# Patient Record
Sex: FEMALE | Race: WHITE | NOT HISPANIC OR LATINO | Employment: FULL TIME | URBAN - METROPOLITAN AREA
[De-identification: names, ages, dates, MRNs, and addresses within clinical notes are randomized per-mention and may not be internally consistent; named-entity substitution may affect disease eponyms.]

---

## 2017-10-19 ENCOUNTER — OFFICE VISIT - RIVER FALLS (OUTPATIENT)
Dept: FAMILY MEDICINE | Facility: CLINIC | Age: 42
End: 2017-10-19

## 2017-10-19 ASSESSMENT — MIFFLIN-ST. JEOR: SCORE: 1607.02

## 2017-10-21 LAB — HBA1C MFR BLD: 5.3 %

## 2017-12-06 ENCOUNTER — OFFICE VISIT - RIVER FALLS (OUTPATIENT)
Dept: FAMILY MEDICINE | Facility: CLINIC | Age: 42
End: 2017-12-06

## 2017-12-06 ASSESSMENT — MIFFLIN-ST. JEOR: SCORE: 1611.56

## 2018-02-22 ENCOUNTER — OFFICE VISIT - RIVER FALLS (OUTPATIENT)
Dept: FAMILY MEDICINE | Facility: CLINIC | Age: 43
End: 2018-02-22

## 2018-02-22 ASSESSMENT — MIFFLIN-ST. JEOR: SCORE: 1519.93

## 2018-02-23 LAB
CREAT SERPL-MCNC: 0.89 MG/DL (ref 0.5–1.1)
GLUCOSE BLD-MCNC: 92 MG/DL (ref 65–99)

## 2018-05-03 ENCOUNTER — OFFICE VISIT - RIVER FALLS (OUTPATIENT)
Dept: FAMILY MEDICINE | Facility: CLINIC | Age: 43
End: 2018-05-03

## 2018-05-03 ASSESSMENT — MIFFLIN-ST. JEOR: SCORE: 1515.39

## 2018-06-28 ENCOUNTER — OFFICE VISIT - RIVER FALLS (OUTPATIENT)
Dept: FAMILY MEDICINE | Facility: CLINIC | Age: 43
End: 2018-06-28

## 2018-10-10 ENCOUNTER — OFFICE VISIT - RIVER FALLS (OUTPATIENT)
Dept: FAMILY MEDICINE | Facility: CLINIC | Age: 43
End: 2018-10-10

## 2018-10-10 ASSESSMENT — MIFFLIN-ST. JEOR: SCORE: 1511.77

## 2019-02-06 ENCOUNTER — OFFICE VISIT - RIVER FALLS (OUTPATIENT)
Dept: FAMILY MEDICINE | Facility: CLINIC | Age: 44
End: 2019-02-06

## 2019-02-06 ASSESSMENT — MIFFLIN-ST. JEOR: SCORE: 1560.75

## 2019-11-26 ENCOUNTER — OFFICE VISIT - RIVER FALLS (OUTPATIENT)
Dept: FAMILY MEDICINE | Facility: CLINIC | Age: 44
End: 2019-11-26

## 2020-02-06 ENCOUNTER — OFFICE VISIT - RIVER FALLS (OUTPATIENT)
Dept: FAMILY MEDICINE | Facility: CLINIC | Age: 45
End: 2020-02-06

## 2020-02-06 ASSESSMENT — MIFFLIN-ST. JEOR: SCORE: 1599.76

## 2020-02-07 LAB
BUN SERPL-MCNC: 16 MG/DL (ref 7–25)
BUN/CREAT RATIO - HISTORICAL: NORMAL (ref 6–22)
CALCIUM SERPL-MCNC: 9.5 MG/DL (ref 8.6–10.2)
CHLORIDE BLD-SCNC: 102 MMOL/L (ref 98–110)
CO2 SERPL-SCNC: 27 MMOL/L (ref 20–32)
CREAT SERPL-MCNC: 0.87 MG/DL (ref 0.5–1.1)
EGFRCR SERPLBLD CKD-EPI 2021: 80 ML/MIN/1.73M2
GLUCOSE BLD-MCNC: 97 MG/DL (ref 65–99)
POTASSIUM BLD-SCNC: 4.5 MMOL/L (ref 3.5–5.3)
SODIUM SERPL-SCNC: 138 MMOL/L (ref 135–146)
TSH SERPL DL<=0.005 MIU/L-ACNC: 4.67 MIU/L

## 2020-02-11 ENCOUNTER — COMMUNICATION - RIVER FALLS (OUTPATIENT)
Dept: FAMILY MEDICINE | Facility: CLINIC | Age: 45
End: 2020-02-11

## 2020-05-11 ENCOUNTER — AMBULATORY - RIVER FALLS (OUTPATIENT)
Dept: FAMILY MEDICINE | Facility: CLINIC | Age: 45
End: 2020-05-11

## 2020-05-12 LAB — TSH SERPL DL<=0.005 MIU/L-ACNC: 4.05 MIU/L

## 2020-05-13 ENCOUNTER — COMMUNICATION - RIVER FALLS (OUTPATIENT)
Dept: FAMILY MEDICINE | Facility: CLINIC | Age: 45
End: 2020-05-13

## 2020-08-18 ENCOUNTER — COMMUNICATION - RIVER FALLS (OUTPATIENT)
Dept: FAMILY MEDICINE | Facility: CLINIC | Age: 45
End: 2020-08-18

## 2020-12-02 ENCOUNTER — OFFICE VISIT - RIVER FALLS (OUTPATIENT)
Dept: FAMILY MEDICINE | Facility: CLINIC | Age: 45
End: 2020-12-02

## 2021-01-07 ENCOUNTER — COMMUNICATION - RIVER FALLS (OUTPATIENT)
Dept: FAMILY MEDICINE | Facility: CLINIC | Age: 46
End: 2021-01-07

## 2022-02-12 VITALS
DIASTOLIC BLOOD PRESSURE: 64 MMHG | HEIGHT: 65 IN | TEMPERATURE: 98.1 F | SYSTOLIC BLOOD PRESSURE: 116 MMHG | BODY MASS INDEX: 32.49 KG/M2 | HEART RATE: 100 BPM | WEIGHT: 195 LBS

## 2022-02-12 VITALS
DIASTOLIC BLOOD PRESSURE: 80 MMHG | WEIGHT: 216.2 LBS | DIASTOLIC BLOOD PRESSURE: 76 MMHG | BODY MASS INDEX: 32.65 KG/M2 | HEART RATE: 76 BPM | HEART RATE: 72 BPM | HEIGHT: 65 IN | SYSTOLIC BLOOD PRESSURE: 120 MMHG | TEMPERATURE: 97.8 F | SYSTOLIC BLOOD PRESSURE: 120 MMHG | TEMPERATURE: 98.1 F | WEIGHT: 196 LBS | HEIGHT: 65 IN | BODY MASS INDEX: 36.02 KG/M2

## 2022-02-12 VITALS
DIASTOLIC BLOOD PRESSURE: 70 MMHG | SYSTOLIC BLOOD PRESSURE: 120 MMHG | TEMPERATURE: 99 F | HEART RATE: 93 BPM | OXYGEN SATURATION: 97 % | BODY MASS INDEX: 31.85 KG/M2 | WEIGHT: 191.4 LBS

## 2022-02-12 VITALS
HEIGHT: 65 IN | SYSTOLIC BLOOD PRESSURE: 118 MMHG | DIASTOLIC BLOOD PRESSURE: 76 MMHG | TEMPERATURE: 97.4 F | BODY MASS INDEX: 34.16 KG/M2 | WEIGHT: 205 LBS | HEART RATE: 80 BPM

## 2022-02-12 VITALS
TEMPERATURE: 98 F | HEIGHT: 65 IN | SYSTOLIC BLOOD PRESSURE: 118 MMHG | BODY MASS INDEX: 32.36 KG/M2 | HEART RATE: 72 BPM | WEIGHT: 194.2 LBS | DIASTOLIC BLOOD PRESSURE: 80 MMHG

## 2022-02-12 VITALS
SYSTOLIC BLOOD PRESSURE: 114 MMHG | BODY MASS INDEX: 35.59 KG/M2 | HEIGHT: 65 IN | HEART RATE: 72 BPM | TEMPERATURE: 99.6 F | WEIGHT: 213.6 LBS | DIASTOLIC BLOOD PRESSURE: 68 MMHG

## 2022-02-12 VITALS
OXYGEN SATURATION: 97 % | BODY MASS INDEX: 37.21 KG/M2 | DIASTOLIC BLOOD PRESSURE: 80 MMHG | WEIGHT: 223.6 LBS | SYSTOLIC BLOOD PRESSURE: 118 MMHG | TEMPERATURE: 98 F | HEART RATE: 81 BPM

## 2022-02-12 VITALS
DIASTOLIC BLOOD PRESSURE: 84 MMHG | BODY MASS INDEX: 35.85 KG/M2 | HEIGHT: 65 IN | TEMPERATURE: 98.8 F | HEART RATE: 92 BPM | WEIGHT: 215.2 LBS | SYSTOLIC BLOOD PRESSURE: 134 MMHG

## 2022-02-15 NOTE — NURSING NOTE
Depression Screening Entered On:  12/4/2020 10:01 AM CST    Performed On:  12/2/2020 10:00 AM CST by Tova Chen               Depression Screening   Little Interest - Pleasure in Activities :   More than half the days   Feeling Down, Depressed, Hopeless :   More than half the days   Initial Depression Screen Score :   4 Score   Poor Appetite or Overeating :   More than half the days   Trouble Falling or Staying Asleep :   Nearly every day   Feeling Tired or Little Energy :   Nearly every day   Feeling Bad About Yourself :   Several days   Trouble Concentrating :   Nearly every day   Moving or Speaking Slowly :   Several days   Thoughts Better Off Dead or Hurting Self :   Not at all   MICHAEL Difficulty with Work, Home, Others :   Somewhat difficult   Detailed Depression Screen Score :   13    Total Depression Screen Score :   17    Tova Chen - 12/4/2020 10:00 AM CST

## 2022-02-15 NOTE — NURSING NOTE
Comprehensive Intake Entered On:  2/6/2019 4:02 PM CST    Performed On:  2/6/2019 3:57 PM CST by Jenny Richards MA               Summary   Chief Complaint :   skin tag removal from neck.  also discuss alternative to Lorcaserin.   Weight Measured :   205 lb(Converted to: 205 lb 0 oz, 92.99 kg)    Height Measured :   65 in(Converted to: 5 ft 5 in, 165.10 cm)    Body Mass Index :   34.11 kg/m2 (HI)    Body Surface Area :   2.06 m2   Systolic Blood Pressure :   118 mmHg   Diastolic Blood Pressure :   76 mmHg   Mean Arterial Pressure :   90 mmHg   Peripheral Pulse Rate :   80 bpm   BP Site :   Right arm   Pulse Site :   Radial artery   BP Method :   Manual   HR Method :   Manual   Temperature Tympanic :   97.4 DegF(Converted to: 36.3 DegC)  (LOW)    Jenny Richards MA - 2/6/2019 3:57 PM CST   Health Status   Allergies Verified? :   Yes   Medication History Verified? :   Yes   Medical History Verified? :   Yes   Pre-Visit Planning Status :   Completed   Tobacco Use? :   Never smoker   Jenny Richards MA - 2/6/2019 3:57 PM CST   Consents   Consent for Immunization Exchange :   Consent Granted   Consent for Immunizations to Providers :   Consent Granted   Jenny Richards MA - 2/6/2019 3:57 PM CST   Meds / Allergies   (As Of: 2/6/2019 4:02:26 PM CST)   Allergies (Active)   Zithromax  Estimated Onset Date:   Unspecified ; Reactions:   vomiting ; Created By:   Jenny Richards; Reaction Status:   Active ; Category:   Drug ; Substance:   Zithromax ; Type:   Allergy ; Updated By:   Jenny Richards; Source:   Paper Chart ; Reviewed Date:   2/22/2018 4:18 PM CST        Medication List   (As Of: 2/6/2019 4:02:26 PM CST)   Prescription/Discharge Order    albuterol  :   albuterol ; Status:   Prescribed ; Ordered As Mnemonic:   Ventolin HFA 90 mcg/inh inhalation aerosol ; Simple Display Line:   2 puff(s), inh, qid, PRN: as needed for wheezing, 3 EA, 2 Refill(s) ; Ordering Provider:   Vlad Farley MD; Catalog Code:   albuterol  ; Order Dt/Tm:   3/31/2016 2:58:59 PM          fluticasone-salmeterol  :   fluticasone-salmeterol ; Status:   Prescribed ; Ordered As Mnemonic:   Advair Diskus 500 mcg-50 mcg inhalation powder ; Simple Display Line:   1 puff(s), inh, bid, 1 EA, 5 Refill(s) ; Ordering Provider:   Vlad Farley MD; Catalog Code:   fluticasone-salmeterol ; Order Dt/Tm:   3/31/2016 2:58:49 PM          hydroCHLOROthiazide  :   hydroCHLOROthiazide ; Status:   Prescribed ; Ordered As Mnemonic:   hydroCHLOROthiazide 12.5 mg oral tablet ; Simple Display Line:   12.5 mg, 1 tab(s), po, daily, 30 tab(s), 2 Refill(s) ; Ordering Provider:   Vlad Farley MD; Catalog Code:   hydroCHLOROthiazide ; Order Dt/Tm:   2/22/2018 4:45:41 PM          lorcaserin  :   lorcaserin ; Status:   Prescribed ; Ordered As Mnemonic:   lorcaserin 20 mg oral tablet, extended release ; Simple Display Line:   See Instructions, Take one by mouth daily, 30 tab(s), 2 Refill(s) ; Ordering Provider:   Vlad Farley MD; Catalog Code:   lorcaserin ; Order Dt/Tm:   10/10/2018 12:04:59 PM            Social History   Social History   (As Of: 2/6/2019 4:02:26 PM CST)   Alcohol:        Never   (Last Updated: 5/17/2012 1:02:27 PM CDT by Gia Carrillo)          Tobacco:        Never   (Last Updated: 3/30/2011 3:42:14 PM CDT by Jenny Richards MA)          Substance Abuse:        Never   (Last Updated: 3/30/2011 3:42:23 PM CDT by Jenny Richards MA)          Employment and Education:        [employer]   (Last Updated: 5/17/2012 1:02:06 PM CDT by Gia Carrillo)          Home and Environment:        Marital status: .  Spouse/Partner name: Jeovanny.  Lives with Children, Spouse.   (Last Updated: 6/12/2013 9:31:49 AM CDT by Jenny Richards MA)          Nutrition and Health:        Type of diet: Regular.   (Last Updated: 5/17/2012 1:05:11 PM CDT by Gia Carrillo)          Sexual:         Comments:  3/30/2011 3:42 PM - Susie DASH, Jenny: --Jeovanny.    (Last Updated: 3/30/2011 3:42:43 PM CDT by Jenny Richards MA)          Other:        First menses age 12.  Menstrual duration 3 days.  Cycle interval 30 days.  No abnormal pap smear.   (Last Updated: 5/17/2012 1:01:42 PM CDT by Gia Carrillo)

## 2022-02-15 NOTE — NURSING NOTE
Comprehensive Intake Entered On:  12/2/2020 9:01 AM CST    Performed On:  12/2/2020 8:56 AM CST by Lacie Vidal CMA               Summary   Chief Complaint :   Med check.    Weight Measured :   223.6 lb(Converted to: 223 lb 10 oz, 101.423 kg)    Height/Length Estimated :   65 in(Converted to: 5 ft 5 in, 165.10 cm)    Systolic Blood Pressure :   118 mmHg   Diastolic Blood Pressure :   80 mmHg   Mean Arterial Pressure :   93 mmHg   Peripheral Pulse Rate :   81 bpm   BP Site :   Right arm   BP Method :   Manual   HR Method :   Electronic   Temperature Tympanic :   98.0 DegF(Converted to: 36.7 DegC)    Oxygen Saturation :   97 %   Lacie Vidal CMA - 12/2/2020 8:56 AM CST   Health Status   Allergies Verified? :   Yes   Medication History Verified? :   Yes   Pre-Visit Planning Status :   N/A   Tobacco Use? :   Never smoker   Lacie Vidal CMA - 12/2/2020 8:56 AM CST   Consents   Consent for Immunization Exchange :   Consent Granted   Consent for Immunizations to Providers :   Consent Granted   Lacie Vidal CMA - 12/2/2020 8:56 AM CST   Meds / Allergies   (As Of: 12/2/2020 9:01:01 AM CST)   Allergies (Active)   Zithromax  Estimated Onset Date:   Unspecified ; Reactions:   vomiting ; Created By:   Jenny Richards; Reaction Status:   Active ; Category:   Drug ; Substance:   Zithromax ; Type:   Allergy ; Updated By:   Jenny Richards; Source:   Paper Chart ; Reviewed Date:   2/22/2018 4:18 PM CST        Medication List   (As Of: 12/2/2020 9:01:01 AM CST)   Prescription/Discharge Order    albuterol  :   albuterol ; Status:   Prescribed ; Ordered As Mnemonic:   Ventolin HFA 90 mcg/inh inhalation aerosol ; Simple Display Line:   2 puff(s), inh, qid, PRN: as needed for wheezing, 3 EA, 2 Refill(s) ; Ordering Provider:   Vlad Farley MD; Catalog Code:   albuterol ; Order Dt/Tm:   3/31/2016 2:58:59 PM CDT            ID Risk Screen   Recent Travel History :   No recent travel   Family Member Travel History :   No  recent travel   Other Exposure to Infectious Disease :   Unknown   Lacie Vidal CMA - 12/2/2020 8:56 AM CST   Social History   Social History   (As Of: 12/2/2020 9:01:01 AM CST)   Alcohol:        Never   (Last Updated: 5/17/2012 1:02:27 PM CDT by Gia Carrillo)          Tobacco:        Never   (Last Updated: 3/30/2011 3:42:14 PM CDT by Jenny Richards MA)   Never (less than 100 in lifetime)   (Last Updated: 12/2/2020 8:59:12 AM CST by Lacie Vidal CMA)          Electronic Cigarette/Vaping:        Electronic Cigarette Use: Never.   (Last Updated: 12/2/2020 8:59:16 AM CST by Lacie Vidal CMA)          Substance Abuse:        Never   (Last Updated: 3/30/2011 3:42:23 PM CDT by Jenny Richards MA)          Employment/School:        [employer]   (Last Updated: 5/17/2012 1:02:06 PM CDT by Gia Carrillo)          Home/Environment:        Marital status: .  Spouse/Partner name: Jeovanny.  Lives with Children, Spouse.   (Last Updated: 6/12/2013 9:31:49 AM CDT by Jenny Richards MA)          Nutrition/Health:        Type of diet: Regular.   (Last Updated: 5/17/2012 1:05:11 PM CDT by Gia Carrillo)          Sexual:         Comments:  3/30/2011 3:42 PM - Jenny Richards MA: --Jeovanny.   (Last Updated: 3/30/2011 3:42:43 PM CDT by Jenny Richards MA)          Other:        First menses age 12.  Menstrual duration 3 days.  Cycle interval 30 days.  No abnormal pap smear.   (Last Updated: 5/17/2012 1:01:42 PM CDT by Gia Carrillo)

## 2022-02-15 NOTE — PROGRESS NOTES
Chief Complaint    Med check.  History of Present Illness       Patient is here today with complaints of increasing problems with depression and anxiety.  She reports feeling like she is on a roller coaster.  She has had trouble with depression in the past.  Symptoms have been present over the last several months.  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  She denies suicidal ideation.  She endorses fatigue and decreased energy.  She also has been quite irritable and has difficulty with worry.  Her youngest daughter has no secondary at [employer].  Her son enlisted in the [employer] and is being [facility] [employer].  This of course is on her as well.  PHQ-9 is 17 and MICHAEL is 19  Review of Systems       See HPI.  All other review of systems negative.  Physical Exam   Vitals & Measurements    T: 98.0  F (Tympanic)  HR: 81 (Peripheral)  BP: 118/80  SpO2: 97%     HT: 65 in  WT: 223.6 lb        Alert, oriented, no acute distress       Normal heart rate       Lungs are clear       Cooperative, depressed affect, tearful  Assessment/Plan       1. Major Depressive Disorder, Single Episode, Unspecified Degree (F32.1)          We reviewed the options for treatment.  I have encouraged counseling.  Bupropion  mg daily has been prescribed.  She will start at 150 mg daily for the first 4 days and then increase to 300 mg daily.  Follow-up visit recommended and 1 month.  Side effects of medication discussed.         30 minutes spent with patient the majority in counseling and discussion of care coordination.       Orders:         albuterol, 2 puff(s), inh, qid, PRN: as needed for wheezing, # 1 EA, 4 Refill(s), Pharmacy: [facility], 2 puff(s) Inhale qid,PRN:as needed for wheezing, 65, in, 02/06/20 17:02:00 CST, Height Measured, 223.6, lb, 12/02/20 8:56:00 CST, Weight Measured, (Ordered)         albuterol, = 2 puff(s), inh, qid, PRN: as needed for  wheezing, # 3 EA, 2 Refill(s), Type: Hard Stop, Pharmacy: [facility], (Completed)         buPROPion, 2 tab(s) ( 300 mg ), Oral, q 24 hrs, # 60 tab(s), 2 Refill(s), Type: Maintenance, Pharmacy: [facility], 2 tab(s) Oral q 24 hrs, 65, in, 02/06/20 17:02:00 CST, Height Measured, 223.6, lb, 12/02/20 8:56:00 CST, Weight Measured, (Ordered)         bupropion-naltrexone, 2 tab(s), Oral, bid, Instructions: take once a day for one week increasing by one tab a week until taking 2 tabs bid, # 120 tab(s), 0 Refill(s), Type: Maintenance, Pharmacy: [facility], 2 tab(s) Oral bid,Instr:take once a day for one week increa..., (Completed)         hydroCHLOROthiazide, = 1 tab(s) ( 12.5 mg ), Oral, daily, # 30 tab(s), 0 Refill(s), Type: Maintenance, Pharmacy: [facility] [place], 1 tab(s) Oral daily, (Completed)         Return to Clinic (Request), RFV: TSH per GTG. Dx: abnormal TSH from 2/6/2020, Return in 6 weeks  Patient Information     Name:MARII LEW ALEJO      Address:      [address] [place] [id number]     Sex:Female     YOB: 1975     [phone]     Emergency Contact:VARSHA LEW     MRN:[id number]     FIN:[id number]     Location:[facility]     Date of Service:12/02/2020      Primary Care Physician:       Vlad Farley MD, [phone]      Attending Physician:       Vlad Farley MD, [phone]  Problem List/Past Medical History    Ongoing     Anxiety     Attention Deficit Disorder of Childhood without Mention of Hyperactivity     Family history of colon cancer     Female stress incontinence     Intermittent asthma     Major Depressive Disorder, Single Episode, Unspecified Degree     Mild recurrent major depression     Obesity     CELINA on CPAP    Historical     Pregnancy     Pregnancy     Pregnancy     Pregnancy     Pregnancy  Procedure/Surgical History     Colonoscopy (04/22/2011)      Comments: Repeat in 5 years due to family hx.  Sedation:   MAC.     THAB x2  Medications    buPROPion 150 mg/24 hours (XL) oral tablet, extended release, 300 mg= 2 tab(s), Oral, q 24 hrs, 2 refills    Ventolin HFA 90 mcg/inh inhalation aerosol, 2 puff(s), Inhale, qid, PRN, 4 refills  Allergies    Zithromax (vomiting)  Social History    Smoking Status     Never smoker     Alcohol      Never     Electronic Cigarette/Vaping      Electronic Cigarette Use: Never.     Employment/School      [employer]     Home/Environment      Marital status: . Spouse/Partner name: Jeovanny. Lives with Children, Spouse.     Nutrition/Health      Type of diet: Regular.     Other      First menses age 12. Menstrual duration 3 days. Cycle interval 30 days. No abnormal pap smear.     Sexual     Substance Abuse      Never     Tobacco      Never (less than 100 in lifetime)  Family History    Cancer: Mother.    Heart disease: Father.    Brother: History is negative    Daughter: History is unknown    Son: History is unknown    Daughter: History is unknown  Immunizations      Vaccine Date Status          Td 04/12/2004 Recorded

## 2022-02-15 NOTE — TELEPHONE ENCOUNTER
---------------------  From: Latrice Roth   To: Miguelito WALLACE, Vlad;     Sent: 11/26/2019 7:47:09 AM CST  Subject: Scheduling Management     PT CANCELED APPT TODAY FOR MED CHECK, DID NOT WANT TO RESCHEDULE.

## 2022-02-15 NOTE — TELEPHONE ENCOUNTER
---------------------  From: Vlad Farley MD   To: MARII LEW    Sent: 5/13/2020 8:40:00 AM CDT  Subject: Patient Message - Results Notification        Your thyroid test is in the normal range.    Results:  Date Result Name Value   5/11/2020 1:06 PM TSH 4.05 mIU/L

## 2022-02-15 NOTE — PROGRESS NOTES
Chief Complaint    skin tag removal from neck.  also discuss alternative to Lorcaserin.  History of Present Illness      Patient is here with several concerns.  She has been taking lorcaserin over the last month without any success in weight loss.  She admits to emotional eating.  She has not tried to count calories in the past.  Her exercise program is minimal.  She has been on phentermine, topiramate/phentermine and lorcaserin previously for her medically supervised weight loss.       She has an irritated skin tag in the left neck.  It gets caught in jewelry.  She has a painful left ring finger.  She reports catching in the morning.  It is sometimes hard to extend the finger.  She denies any injury.  Review of Systems      See HPI.  All other review of systems negative.  Physical Exam   Vitals & Measurements    T: 97.4   F (Tympanic)  HR: 80(Peripheral)  BP: 118/76     HT: 65 in  WT: 205 lb  BMI: 34.11       Alert, oriented, no acute distress       Normal heart rate        Lungs are clear        1 mm and 2 mm slightly irritated skin tags left neck        Tenderness in the palmar aspect of the left hand along the fourth metacarpal and MCP joint.  No catching appreciated.        Cooperative, appropriate affect  Assessment/Plan       Obesity (E66.8)         Encouraged a 1602 to 1800-calorie diet and increase activity.  Trial of Contrave.       Skin tag (L91.8)         2 skin tags easily removed with a pickups and sharp scissors       Trigger finger, left (M65.342)        Observe for now.  If he becomes more symptomatic consider injection or referral to hand surgery        Orders:         bupropion-naltrexone, 2 tab(s), Oral, bid, Instructions: take once a day for one week increasing by one tab a week until taking 2 tabs bid, # 120 tab(s), 0 Refill(s), Type: Maintenance, Pharmacy: [facility], 2 tab(s) Oral bid,Instr:take once a day for one week increa..., (Ordered)  Patient Information     Name:VIPIN  MARII DHILLON      Address:      [address] [place] [id number]     Sex:Female     YOB: 1975     [phone]     Emergency Contact:VARSHA LEW     MRN:[id number]     FIN:[id number]     Location:[facility]     Date of Service:02/06/2019      Primary Care Physician:       Vlad Farley MD, [phone]      Attending Physician:       Vlad Farley MD, [phone]  Problem List/Past Medical History    Ongoing     Anxiety     Attention Deficit Disorder of Childhood without Mention of Hyperactivity     Family history of colon cancer     Female stress incontinence     Intermittent asthma     Major Depressive Disorder, Single Episode, Unspecified Degree     Mild recurrent major depression     Obesity     CELINA on CPAP    Historical     Pregnancy     Pregnancy     Pregnancy     Pregnancy     Pregnancy  Procedure/Surgical History     Colonoscopy (04/22/2011)      Comments: Repeat in 5 years due to family hx. Sedation:   MAC.     THAB x2  Medications        Advair Diskus 500 mcg-50 mcg inhalation powder: 1 puff(s), inh, bid, 1 EA, 5 Refill(s).        Ventolin HFA 90 mcg/inh inhalation aerosol: 2 puff(s), inh, qid, PRN: as needed for wheezing, 3 EA, 2 Refill(s).        hydroCHLOROthiazide 12.5 mg oral tablet: 12.5 mg, 1 tab(s), po, daily, 30 tab(s), 2 Refill(s).        Contrave 8 mg-90 mg oral tablet, extended release: 2 tab(s), Oral, bid, take once a day for one week increasing by one tab a week until taking 2 tabs bid, 120 tab(s), 0 Refill(s).         Allergies    Zithromax (vomiting)  Social History    Smoking Status - 02/06/2019     Never smoker     Alcohol      Never, 05/17/2012     Employment and Education      [employer] School [employer], 05/17/2012     Home and Environment      Marital status: . Spouse/Partner name: Varsha. Lives with Children, Spouse., 06/12/2013     Nutrition and Health      Type of diet: Regular., 05/17/2012     Other      First  menses age 12. Menstrual duration 3 days. Cycle interval 30 days. No abnormal pap smear., 05/17/2012     Sexual     Substance Abuse      Never, 03/30/2011     Tobacco      Never, 03/30/2011  Family History    Cancer: Mother.    Heart disease: Father.    Brother: History is negative    Daughter: History is unknown    Son: History is unknown    Daughter: History is unknown  Immunizations      Vaccine Date Status      Td 04/12/2004 Recorded

## 2022-02-15 NOTE — PROGRESS NOTES
Chief Complaint    med check--hasn't been taking meds for awhile.  History of Present Illness      Patient is here with concerns about fatigue, weight gain, upper and lower extremity paresthesias, depression, and asthma. She has not been taking sertraline or her inhalers for the last 6 months. She has not changed her diet and has been trying to stay active. She is been gaining weight despite these activities. Her asthma has been good over last few months despite not taking her controller.  Review of Systems          Constitutional:  No fever, No chills, No sweats, No weakness, fatigue.            Eye:  No recent visual problem.            Ear/Nose/Mouth/Throat:  No decreased hearing, No nasal congestion, No sore throat.            Respiratory:  No shortness of breath, No cough.            Cardiovascular:  Negative, No chest pain, No palpitations, No peripheral edema.            Gastrointestinal:  No nausea, No vomiting, No diarrhea, No constipation, No heartburn.            Genitourinary:  No dysuria, No change in urine stream.            Hematology/Lymphatics:  No bruising tendency, No bleeding tendency.            Endocrine:  No cold intolerance, No heat intolerance.            Immunologic:  Negative.            Musculoskeletal:  No back pain, No neck pain, No joint pain, No muscle pain.            Integumentary:  No rash, No dryness, No skin lesion.            Neurologic:  Alert and oriented X4, No headache.                Psychiatric:  No anxiety, No depression  Physical Exam   Vitals & Measurements    T: 98.8(Tympanic)  HR: 92(Peripheral)  BP: 134/84     HT: 65 in  WT: 215.2 lb  BMI: 35.81           General:  Alert and oriented, No acute distress.            Eye:  Pupils are equal, round and reactive to light, Extraocular movements are intact, Normal conjunctiva.            HENT:  Normocephalic, Tympanic membranes are clear, Oral mucosa is moist, No pharyngeal erythema, No sinus tenderness.            Neck:   Supple, Non-tender, No carotid bruit, No lymphadenopathy, No thyromegaly.            Respiratory:  Lungs are clear to auscultation, Respirations are non-labored, Breath sounds are equal, No chest wall tenderness.            Cardiovascular:  Normal rate, Regular rhythm, No murmur, No gallop, Normal peripheral perfusion, edema.            Breast:  No mass, No tenderness, No discharge.            Gastrointestinal:  Soft, Non-tender, Normal bowel sounds, No organomegaly.            Genitourinary:  No costovertebral angle tenderness.            Musculoskeletal:  Normal range of motion, Normal strength, No swelling, No deformity.            Integumentary:  Warm, Dry, No rash.            Neurologic:  Alert, Oriented, Normal sensory, Normal motor function, No focal deficits, Normal deep tendon reflexes.            Psychiatric:  Cooperative, Appropriate mood & affect.    Assessment/Plan       Edema, peripheral         Ordered:                Paresthesia         Labs as below have been ordered for further evaluation of her symptoms. Consider resuming sertraline and steroid inhaler.                  Ordered:          Folate, serum* ([facility]), Specimen Type: Serum, Collection Date: 10/19/17 15:42:00 CDT          Hemoglobin A1c* ([facility]), Specimen Type: Blood, Collection Date: 10/19/17 15:42:00 CDT          Hepatic Function Panel* ([facility]), Specimen Type: Serum, Collection Date: 10/19/17 15:42:00 CDT          Protein, Total And Protein Electrophoresis* ([facility]), Specimen Type: Serum, Collection Date: 10/19/17 15:42:00 CDT          Sed rate by modified westergren* ([facility]), Specimen Type: Blood, Collection Date: 10/19/17 15:42:00 CDT          TSH* ([facility]), Specimen Type: Serum, Collection Date: 10/19/17 15:42:00 CDT          Vitamin B12 (Room)* ([facility]), Specimen Type: Serum, Collection Date: 10/19/17 15:42:00 CDT           Patient Information     Name:MARII LEW      Address:      [address] [place] [id number]      Sex:Female     YOB: 1975     [phone]     Emergency Contact:VARSHA LEW     MRN:[id number]     FIN:[id number]     Location:[facility]     Date of Service:10/19/2017      Primary Care Physician:       Vlad Farley MD, [phone]  Problem List/Past Medical History    Ongoing     Anxiety     Attention Deficit Disorder of Childhood without Mention of Hyperactivity     Family History of Colon Cancer     Female Stress Incontinence     Intermittent Asthma     Major Depressive Disorder, Single Episode, Unspecified Degree     Mild recurrent major depression     Obesity     CELINA on CPAP    Historical     Pregnancy     Pregnancy     Pregnancy     Pregnancy     Pregnancy  Procedure/Surgical History     Colonoscopy (04/22/2011)     THAB x2  Medications    Advair Diskus 500 mcg-50 mcg inhalation powder, 1 puff(s), inh, bid, 5 refills,   Not taking    sertraline 100 mg oral tablet, See Instructions, 5 refills,   Not taking    Ventolin HFA 90 mcg/inh inhalation aerosol, 2 puff(s), inh, qid, PRN, 2 refills,   Not taking  Allergies    Zithromax (vomiting)  Social History    Smoking Status - 10/19/2017     Never smoker     Alcohol - 06/12/2013      Never     Employment and Education - 06/12/2013      [employer] School [employer]     Exercise and Physical Activity - Does not exercise, 06/12/2013     Home and Environment - 06/12/2013      Marital status: . Spouse/Partner name: Varsha. Lives with Children, Spouse.     Nutrition and Health - 06/12/2013      Type of diet: Regular.     Other - 06/12/2013      First menses age 12. Menstrual duration 3 days. Cycle interval 30 days. No abnormal pap smear.     Sexual - 06/12/2013     Substance Abuse - 06/12/2013      Never     Tobacco - 06/12/2013      Never  Family History    Cancer: Mother.    Heart disease: Father.  Immunizations      Vaccine Date Status      Td 04/12/2004 Recorded  Lab Results   Results (Last 90 days)   No  results located.

## 2022-02-15 NOTE — PROGRESS NOTES
Chief Complaint    review lab results--abnormal ALT from 10/19/17  History of Present Illness      Patient is here for follow-up on her slightly abnormal liver tests.  Paresthesias have improved her last visit.  Anxiety depression stable.  She is not really interested in resuming sertraline.  She continues to be concerned about her weight.  She has not been able to lose weight despite being on a fairly good diet.  She does not exercise regularly.  She had some success with phentermine in the past for weight loss.  He feels her weight is adversely affecting her health.  Review of Systems      See HPI.  All other review of systems negative.  Physical Exam   Vitals & Measurements    T: 97.8(Tympanic)  HR: 76(Peripheral)  BP: 120/80     HT: 65 in  WT: 216.2 lb  BMI: 35.97       Alert, oriented, no acute distress      Depressed affect       Normal heart rate       Nonlabored breathing  Assessment/Plan       Anxiety         Continue to monitor this as it seems to be fairly stable.                Elevated ALT measurement         Suspect this is due to fatty liver, recheck transaminase levels         Ordered:          Hepatic Function Panel* ([facility]), Specimen Type: Serum, Collection Date: 12/06/17 15:50:00 CST                Obesity         Trial of phentermine topiramate, discussed side effects of medication, if she needs treatment for depression consider bupropion if she remains on phentermine.  She will follow-up in 1 month.  I have informed her that we will most likely need a prior authorization for the medication this may take a bit of time.                Orders:         phentermine-topiramate, 1 cap(s), po, qam, # 14 cap(s), 0 Refill(s), Type: Maintenance, Pharmacy: [facility], 1 cap(s) po qam,x14 day(s)         phentermine-topiramate, 1 cap(s), po, qam, # 30 cap(s), 0 Refill(s), Type: Maintenance, Pharmacy: [facility], 1 cap(s) po qam  Patient Information     Name:YONATAN LEWELI DHILLON      Address:       [address] [place] [address]     Sex:Female     YOB: 1975     [phone]     Emergency Contact:VARSHA LEW     MRN:[id number]     FIN:[id number]     Location:[facility]     Date of Service:12/06/2017      Primary Care Physician:       Vlad Farley MD, [phone]  Problem List/Past Medical History    Ongoing     Anxiety     Attention Deficit Disorder of Childhood without Mention of Hyperactivity     Family history of colon cancer     Female stress incontinence     Intermittent asthma     Major Depressive Disorder, Single Episode, Unspecified Degree     Mild recurrent major depression     Obesity     CELINA on CPAP    Historical     Pregnancy     Pregnancy     Pregnancy     Pregnancy     Pregnancy  Procedure/Surgical History     Colonoscopy (04/22/2011)     THAB x2  Medications    Advair Diskus 500 mcg-50 mcg inhalation powder, 1 puff(s), inh, bid, 5 refills    phentermine-topiramate 3.75 mg-23 mg oral capsule, extended release, 1 cap(s), po, qam    phentermine-topiramate 7.5 mg-46 mg oral capsule, extended release, 1 cap(s), po, qam    sertraline 100 mg oral tablet, See Instructions, 5 refills    Ventolin HFA 90 mcg/inh inhalation aerosol, 2 puff(s), inh, qid, PRN, 2 refills  Allergies    Zithromax (vomiting)  Social History    Smoking Status - 12/06/2017     Never smoker     Alcohol - 06/12/2013      Never     Employment and Education - 06/12/2013      High School Student [employer]     Home and Environment - 06/12/2013      Marital status: . Spouse/Partner name: Varsha. Lives with Children, Spouse.     Nutrition and Health - 06/12/2013      Type of diet: Regular.     Other - 06/12/2013      First menses age 12. Menstrual duration 3 days. Cycle interval 30 days. No abnormal pap smear.     Sexual - 06/12/2013     Substance Abuse - 06/12/2013      Never     Tobacco - 06/12/2013      Never  Family History    Cancer: Mother.    Heart disease:  Father.  Immunizations      Vaccine Date Status      Td 04/12/2004 Recorded  Lab Results      Results (Last 90 days)                Laboratory                     Chemistry                          General Chemistry                               A/G Ratio                                     1.8   (10/19/17 03:52 PM CDT)                                                                                                                                                2.1   (12/06/17 03:58 PM CST)                                                                                                                                          ALT/SGPT                                     H 47 unit/L (Range 6 - 29)  (10/19/17 03:52 PM CDT)                                                                                                                                                H 33 unit/L (Range 6 - 29)  (12/06/17 03:58 PM CST)                                                                                                                                          AST/SGOT                                     27 unit/L  (10/19/17 03:52 PM CDT)                                                                                                                                                20 unit/L  (12/06/17 03:58 PM CST)                                                                                                                                          Albumin Level                                     4.6 gm/dL  (10/19/17 03:52 PM CDT)                                                                                                                                                4.7 gm/dL  (10/19/17 03:52 PM CDT)                                                                                                                                                4.6 gm/dL  (12/06/17 03:58 PM CST)                                                                                                                                           Alkaline Phosphatase                                     41 unit/L  (10/19/17 03:52 PM CDT)                                                                                                                                                44 unit/L  (12/06/17 03:58 PM CST)                                                                                                                                          Bilirubin Direct                                     0.2 mg/dL  (10/19/17 03:52 PM CDT)                                                                                                                                                0.1 mg/dL  (12/06/17 03:58 PM CST)                                                                                                                                          Bilirubin Indirect                                     1.0   (10/19/17 03:52 PM CDT)                                                                                                                                                0.7   (12/06/17 03:58 PM CST)                                                                                                                                          Bilirubin Total                                     1.2 mg/dL  (10/19/17 03:52 PM CDT)                                                                                                                                                0.8 mg/dL  (12/06/17 03:58 PM CST)                                                                                                                                          Folate                                        (10/19/17 03:52 PM CDT)                                                                                                                                                12.5 ng/mL  (10/19/17 03:52 PM CDT)                                                                                                                                           Globulin                                     2.6   (10/19/17 03:52 PM CDT)                                                                                                                                                2.2   (12/06/17 03:58 PM CST)                                                                                                                                          Hgb A1c                                        (10/19/17 03:52 PM CDT)                                                                                                                                                5.3   (10/19/17 03:52 PM CDT)                                                                                                                                          Protein Total                                        (10/19/17 03:52 PM CDT)                                                                                                                                                7.2 gm/dL  (10/19/17 03:52 PM CDT)                                                                                                                                                7.2 gm/dL  (10/19/17 03:52 PM CDT)                                                                                                                                                   (12/06/17 03:58 PM CST)                                                                                                                                                6.8 gm/dL  (12/06/17 03:58 PM CST)                                                                                                                                     Special Chemistry                               Alpha 1 Globulin:      0.2 gm/dL  (10/19/17 03:52 PM CDT)                                                                                                                                           Alpha 2 Globulin:      0.6 gm/dL  (10/19/17 03:52 PM CDT)                                                                                                                                          Beta 1 Globulin:      0.4 gm/dL  (10/19/17 03:52 PM CDT)                                                                                                                                          Beta 2 Globulin:      0.3 gm/dL  (10/19/17 03:52 PM CDT)                                                                                                                                          Gamma Globulin:      0.9 gm/dL  (10/19/17 03:52 PM CDT)                                                                                                                                          Protein Electrophoresis Interp:      Protein electrophoresis pattern appears normal.   (10/19/17 03:52 PM CDT)                                                                                                                                     Thyroid Studies                               TSH                                        (10/19/17 03:52 PM CDT)                                                                                                                                                3.43 mIU/L  (10/19/17 03:52 PM CDT)                                                                                                                                     Vitamins                               Vitamin B12 Level                                        (10/19/17 03:52 PM CDT)                                                                                                                                                309 pg/mL  (10/19/17 03:52 PM CDT)                                                                                                                                 Hematology                          Other Hematology                               Sed Rate                                        (10/19/17 03:52 PM CDT)                                                                                                                                                6   (10/19/17 03:52 PM CDT)

## 2022-02-15 NOTE — NURSING NOTE
Patient called  at 1:42 because she had not received lab results. After a review of her chart it was noted the results were sent through the portal.  Patient unsure if she has access so I printed and mailed results.

## 2022-02-15 NOTE — TELEPHONE ENCOUNTER
---------------------  From: MARII LEW  To: [facility]  Sent: 01/07/2021 03:18 p.m. CST  Subject: Refill of meds  Donna Farley, I was wondering if I could get a refill on my meds or if you needed me to schedule an appointment?  I have been doing very well on the Bupropion and have definitely felt a big difference in how I feel and act.  I have lost a few lbs. but that could be due to the changes we've made at home with [employer].  I didn't realize how low I am (only a few pills left) so if you could, call it into the [facility] pharmacy or I can hopefully get in to see you early next week.  Thank you.   -Anh Lew---------------------  From: Sophia Redmond CMA ([employer] ([id number]   To: [facility] [employer] ([id number]);     Sent: 1/7/2021 3:23:32 PM CST  Subject: FW: Refill of meds---------------------  From: Natalia Michelle LPN ([employer] ([id number]))   To: Vlad Farley MD;     Sent: 1/7/2021 3:43:47 PM CST  Subject: FW: Refill of meds---------------------  From: Vlad Farley MD   To: [employer] ([id number]);     Sent: 1/7/2021 8:50:17 PM CST  Subject: RE: Refill of meds     She should have two refills left.  If not OK to refill for 2 more months.sent message to patient via portal

## 2022-02-15 NOTE — PROGRESS NOTES
Chief Complaint      f/u wt loss.      History of Present Illness      Patient is here for follow-up on weight loss.  She is currently on phentermine topiramate.  She has no side effects medication.  Her weight loss has stalled at the current dose.  She has not been quite as good with her activity nor her diet over the last several months.      Review of Systems      See HPI.  All other review of systems negative.      Physical Exam         Vitals & Measurements        T: 98.1   F (Tympanic)  HR: 100(Peripheral)  BP: 116/64         HT: 65 in  WT: 195 lb  BMI: 32.45       Alert, oriented, no acute distress      Normal heart rate      Nonlabored breathing      Cooperative, appropriate affect      Assessment/Plan      Obesity (E66.09)         Increase phentermine department to 11.2 5 69 and follow-up in 2 months.      Orders:        phentermine-topiramate, 1 cap(s), po, qam, # 30 cap(s), 1 Refill(s), Type: Maintenance, Pharmacy: [facility], 1 cap(s) po qam, (Ordered)        phentermine-topiramate, 1 cap(s), po, qam, # 14 cap(s), 0 Refill(s), Type: Maintenance, Pharmacy: [facility], 1 cap(s) po qam,x14 day(s), (Completed)        phentermine-topiramate, 1 cap(s), po, qam, # 30 cap(s), 1 Refill(s), Type: Hard Stop, Pharmacy: [facility], (Completed)      Patient Information      Name:  MARII LEW      Address:       [address] [place] [id number]      Sex: Female      YOB: 1975      Phone: [phone]      Emergency Contact: VARSHA LEW      MRN: [id number]      FIN: [id number]      Location: [facility]      Date of Service: 05/03/2018      Primary Care Physician:       Vlad Farley MD, [phone]      Attending Physician:       Vlad Farley MD, [phone]      Problem List/Past Medical History      Ongoing       Anxiety       Attention Deficit Disorder of Childhood without Mention of Hyperactivity       Family history  of colon cancer       Female stress incontinence       Intermittent asthma       Major Depressive Disorder, Single Episode, Unspecified Degree       Mild recurrent major depression       Obesity       CELINA on CPAP      Historical        Pregnancy        Pregnancy        Pregnancy        Pregnancy        Pregnancy      Procedure/Surgical History        Colonoscopy (04/22/2011)          Comments: Repeat in 5 years due to family hx Sedation: &nbsp; MAC        THAB x2                Medications        Advair Diskus 500 mcg-50 mcg inhalation powder: 1 puff(s), inh, bid, 1 EA, 5 Refill(s).        Ventolin HFA 90 mcg/inh inhalation aerosol: 2 puff(s), inh, qid, PRN: as needed for wheezing, 3 EA, 2 Refill(s).        hydroCHLOROthiazide 12.5 mg oral tablet: 12.5 mg, 1 tab(s), po, daily, 30 tab(s), 2 Refill(s).        phentermine-topiramate 11.25 mg-69 mg oral capsule, extended release: 1 cap(s), po, qam, 30 cap(s), 1 Refill(s).                    Allergies      Zithromax (vomiting)      Social History       Smoking Status - 05/03/2018        Never smoker      Alcohol       Never, 05/17/2012      Employment and Education       [employer], 05/17/2012      Home and Environment       Marital status: . Spouse/Partner name: Jeovanny. Lives with Children, Spouse., 06/12/2013      Nutrition and Health       Type of diet: Regular., 05/17/2012      Other       First menses age 12. Menstrual duration 3 days. Cycle interval 30 days. No abnormal pap smear., 05/17/2012      Sexual      Substance Abuse       Never, 03/30/2011      Tobacco       Never, 03/30/2011      Family History       Cancer: Mother.       Heart disease: Father.        Daughter: History is unknown        Son: History is unknown        Daughter: History is unknown        Brother: History is negative      Immunizations           Vaccine           Date           Status           Td          04/12/2004          Recorded      Lab Results          Lab  Results (Last 4 results within 90 days)           Sodium Level: 138 mmol/L [135 mmol/L - 146 mmol/L] (02/22/18 16:57:00)          Potassium Level: 4.1 mmol/L [3.5 mmol/L - 5.3 mmol/L] (02/22/18 16:57:00)          Chloride Level: 105 mmol/L [98 mmol/L - 110 mmol/L] (02/22/18 16:57:00)          CO2 Level: 25 mmol/L [20 mmol/L - 31 mmol/L] (02/22/18 16:57:00)          Glucose Level: 92 mg/dL [65 mg/dL - 99 mg/dL] (02/22/18 16:57:00)          BUN: 12 mg/dL [7 mg/dL - 25 mg/dL] (02/22/18 16:57:00)          Creatinine Level: 0.89 mg/dL [0.5 mg/dL - 1.1 mg/dL] (02/22/18 16:57:00)          BUN/Creat Ratio: NOT APPLICABLE [6  - 22] (02/22/18 16:57:00)          eGFR: 79 mL/min/1.73m2 [8.6 mg/dL - 10.2 mg/dL] (02/22/18 16:57:00)          eGFR African American: 91 mL/min/1.73m2 [6  - 22] (02/22/18 16:57:00)          Calcium Level: 9.7 mg/dL [8.6 mg/dL - 10.2 mg/dL] (02/22/18 16:57:00)         Signature Line  Signed and Authored by Vlad Farley MD on 05/04/2018 12:28 PM CDT         Charted Date:      May 04, 2018 12:26 PM CDT      Subject / Title:      Obesity      Performed By:      Vlad Farley MD on May 04, 2018 12:28 PM CDT      Electronically Signed By:      Vlad Farley MD on May 04, 2018 12:28 PM CDT      Visit Information:      [id number], [facility], Outpatient, 5/3/2018 - 5/3/2018

## 2022-02-15 NOTE — TELEPHONE ENCOUNTER
---------------------  From: Joie Humphries (Phone Messages Pool ([id number]_[facility]))   To: [employer] ([id number] [facility] [place]);     Sent: 2/11/2020 1:46:28 PM CST  Subject: FW: For Dr. Farley - test results           ---------------------  From: MARII LEW  To: [facility]  Sent: 02/11/2020 01:40 p.m. CST  Subject: For Dr. Farley - test results  Dr. Farley, Thank you for getting back to me.  I will  the prescription later.  I wanted to ask about the elevated TSH level.  Could this be a reason for some of the symptoms I ve been experiencing lately?  I see from my test results the levels have creeped up for a couple years.  Noticeably,  I can t seem to keep my weight under control,  I m always sluggish and tired, and have a very low energy level, but I chalked that up to some depression, especially this time of year. I also wonder if this could be part of the reason my legs and feet are swollen and sore?  Lately, I ve felt a little defeated with the changes in my health and although I m not looking for a bad prognosis, maybe these elevated  levels have been enough to make me feel the changes I ve been experiencing.  Please let me know your thoughts on this.  Thank you - Anh---------------------  From: Jenny Richards MA ([employer] Message Pool ([id number]_[facility]))   To: Vlad Farley MD;     Sent: 2/11/2020 2:03:53 PM CST  Subject: FW: For Dr. Farley - test results---------------------  From: Vlad Farley MD   To: [employer] ([id number] [facility] [place]);     Sent: 2/11/2020 4:39:45 PM CST  Subject: RE: For Dr. Farley - test results     The TSH is not high enough to consider treatment for hypothyroidism.  Advise a repeat TSH in about 6 weeks.---------------------  From: Jenny Richards MA ([employer] ([id number]_[facility]))   To: MARII LEW    Sent: 2/11/2020 5:19:34 PM CST  Subject: FW: For   Miguelito - test results     Good Afternoon, Dr. Miguelito Kamara received your message from earlier today.  Please see his response.  If you have any further concerns, please let us know.    Sincerely,      Jenny GUPTA CMA/Dr. Farley

## 2022-02-15 NOTE — NURSING NOTE
Comprehensive Intake Entered On:  2/6/2020 5:07 PM CST    Performed On:  2/6/2020 5:02 PM CST by Jenny Richards MA               Summary   Chief Complaint :   c/o increased swelling in feet and legs since last summer, getting worse.  was worse over Kei.   Weight Measured :   213.6 lb(Converted to: 213 lb 10 oz, 96.89 kg)    Height Measured :   65 in(Converted to: 5 ft 5 in, 165.10 cm)    Body Mass Index :   35.54 kg/m2 (HI)    Body Surface Area :   2.11 m2   Systolic Blood Pressure :   114 mmHg   Diastolic Blood Pressure :   68 mmHg   Mean Arterial Pressure :   83 mmHg   Peripheral Pulse Rate :   72 bpm   BP Site :   Left arm   Pulse Site :   Radial artery   BP Method :   Manual   HR Method :   Manual   Temperature Tympanic :   99.6 DegF(Converted to: 37.6 DegC)    Jenny Richards MA - 2/6/2020 5:02 PM CST   Health Status   Allergies Verified? :   Yes   Medication History Verified? :   Yes   Medical History Verified? :   Yes   Pre-Visit Planning Status :   Completed   Tobacco Use? :   Never smoker   Jenny Richards MA - 2/6/2020 5:02 PM CST   Consents   Consent for Immunization Exchange :   Consent Granted   Consent for Immunizations to Providers :   Consent Granted   Jenny Richards MA - 2/6/2020 5:02 PM CST   Meds / Allergies   (As Of: 2/6/2020 5:07:13 PM CST)   Allergies (Active)   Zithromax  Estimated Onset Date:   Unspecified ; Reactions:   vomiting ; Created By:   Jenny Richards; Reaction Status:   Active ; Category:   Drug ; Substance:   Zithromax ; Type:   Allergy ; Updated By:   eJnny Richards; Source:   Paper Chart ; Reviewed Date:   2/22/2018 4:18 PM CST        Medication List   (As Of: 2/6/2020 5:07:13 PM CST)   Prescription/Discharge Order    albuterol  :   albuterol ; Status:   Prescribed ; Ordered As Mnemonic:   Ventolin HFA 90 mcg/inh inhalation aerosol ; Simple Display Line:   2 puff(s), inh, qid, PRN: as needed for wheezing, 3 EA, 2 Refill(s) ; Ordering Provider:   Vlad Farley MD;  Catalog Code:   albuterol ; Order Dt/Tm:   3/31/2016 2:58:59 PM CDT          bupropion-naltrexone  :   bupropion-naltrexone ; Status:   Prescribed ; Ordered As Mnemonic:   Contrave 8 mg-90 mg oral tablet, extended release ; Simple Display Line:   2 tab(s), Oral, bid, take once a day for one week increasing by one tab a week until taking 2 tabs bid, 120 tab(s), 0 Refill(s) ; Ordering Provider:   Vlad Farley MD; Catalog Code:   bupropion-naltrexone ; Order Dt/Tm:   2/6/2019 4:16:08 PM CST          fluticasone-salmeterol  :   fluticasone-salmeterol ; Status:   Completed ; Ordered As Mnemonic:   Advair Diskus 500 mcg-50 mcg inhalation powder ; Simple Display Line:   1 puff(s), inh, bid, 1 EA, 5 Refill(s) ; Ordering Provider:   Vlad Farley MD; Catalog Code:   fluticasone-salmeterol ; Order Dt/Tm:   3/31/2016 2:58:49 PM CDT          hydroCHLOROthiazide  :   hydroCHLOROthiazide ; Status:   Completed ; Ordered As Mnemonic:   hydroCHLOROthiazide 12.5 mg oral tablet ; Simple Display Line:   12.5 mg, 1 tab(s), po, daily, 30 tab(s), 2 Refill(s) ; Ordering Provider:   Vlad Farley MD; Catalog Code:   hydroCHLOROthiazide ; Order Dt/Tm:   2/22/2018 4:45:41 PM CST            Social History   Social History   (As Of: 2/6/2020 5:07:13 PM CST)   Alcohol:        Never   (Last Updated: 5/17/2012 1:02:27 PM CDT by Gia Carrillo)          Tobacco:        Never   (Last Updated: 3/30/2011 3:42:14 PM CDT by Jenny Richards MA)          Substance Abuse:        Never   (Last Updated: 3/30/2011 3:42:23 PM CDT by Jenny Richards MA)          Employment/School:        [employer]   (Last Updated: 5/17/2012 1:02:06 PM CDT by Gia Carrillo)          Home/Environment:        Marital status: .  Spouse/Partner name: Jeovanny.  Lives with Children, Spouse.   (Last Updated: 6/12/2013 9:31:49 AM CDT by Susie DASH, Jenny)          Nutrition/Health:        Type of diet: Regular.   (Last Updated: 5/17/2012 1:05:11  PM CDT by Gia Carrillo)          Sexual:         Comments:  3/30/2011 3:42 PM - Jenny Richards MA: --Jeovanny.   (Last Updated: 3/30/2011 3:42:43 PM CDT by Jenny Richards MA)          Other:        First menses age 12.  Menstrual duration 3 days.  Cycle interval 30 days.  No abnormal pap smear.   (Last Updated: 5/17/2012 1:01:42 PM CDT by Gia Carrillo)

## 2022-02-15 NOTE — PROGRESS NOTES
Chief Complaint    Med check. skin tag removal  History of Present Illness      Patient is here for follow-up on her obesity.  At her last visit phentermine-topiramate was increased.  She has lost about 4 pounds since her last visit.  She has no side effects from medication.  She feels it is working to curb her appetite.  She is trying to be more active.  Review of Systems      See HPI.  All other review of systems negative.  Physical Exam   Vitals & Measurements    T: 99   F (Tympanic)  HR: 93(Peripheral)  BP: 120/70  SpO2: 97%       Alert, oriented, no acute distress      Normal heart rate      Nonlabored breathing  Assessment/Plan       Obesity(E66.09)        Phentermine-primary increased to 15-92.  Continue with exercise, dietary management, and follow-up in 2 months       Orders:         phentermine-topiramate, 1 cap(s), po, qam, # 30 cap(s), 1 Refill(s), Type: Maintenance, Pharmacy: [facility], 1 cap(s) po qam, (Ordered)  Patient Information     Name:MARII LEW      Address:      [address] [place] [address]      Sex:Female      YOB: 1975      [phone]      Emergency Contact:VARSHA LEW     MRN:[id number]     FIN:[id number]     Location:[facility]     Date of Service:06/28/2018      Primary Care Physician:       Vlad Farley MD, [phone]      Attending Physician:       Vlad Farley MD, [phone]  Problem List/Past Medical History    Ongoing     Anxiety     Attention Deficit Disorder of Childhood without Mention of Hyperactivity     Family history of colon cancer     Female stress incontinence     Intermittent asthma     Major Depressive Disorder, Single Episode, Unspecified Degree     Mild recurrent major depression     Obesity     CELINA on CPAP    Historical     Pregnancy     Pregnancy     Pregnancy     Pregnancy     Pregnancy  Procedure/Surgical History     Colonoscopy (04/22/2011)     THAB x2  Medications        Advair Diskus 500 mcg-50  mcg inhalation powder: 1 puff(s), inh, bid, 1 EA, 5 Refill(s).        Ventolin HFA 90 mcg/inh inhalation aerosol: 2 puff(s), inh, qid, PRN: as needed for wheezing, 3 EA, 2 Refill(s).        hydroCHLOROthiazide 12.5 mg oral tablet: 12.5 mg, 1 tab(s), po, daily, 30 tab(s), 2 Refill(s).        phentermine-topiramate 15 mg-92 mg oral capsule, extended release: 1 cap(s), po, qam, 30 cap(s), 1 Refill(s).                Allergies    Zithromax (vomiting)  Social History    Smoking Status - 06/28/2018     Never smoker     Alcohol      Never, 05/17/2012     Employment and Education      [employer], 05/17/2012     Home and Environment      Marital status: . Spouse/Partner name: Jeovanny. Lives with Children, Spouse., 06/12/2013     Nutrition and Health      Type of diet: Regular., 05/17/2012     Other      First menses age 12. Menstrual duration 3 days. Cycle interval 30 days. No abnormal pap smear., 05/17/2012     Sexual     Substance Abuse      Never, 03/30/2011     Tobacco      Never, 03/30/2011  Family History    Cancer: Mother.    Heart disease: Father.    Daughter: History is unknown    Son: History is unknown    Daughter: History is unknown    Brother: History is negative  Immunizations      Vaccine Date Status      Td 04/12/2004 Recorded

## 2022-02-15 NOTE — NURSING NOTE
Generalized Anxiety Disorder Screening Entered On:  12/4/2020 10:01 AM CST    Performed On:  12/2/2020 10:00 AM CST by Tova Chen               Generalized Anxiety Disorder Screening   MICHAEL Nervous, Anxious On Edge :   More than half the days   MICHAEL Control Worrying B :   Nearly every day   MICHAEL Worrying Too Much :   Nearly every day   MICHAEL Trouble Relaxing :   Nearly every day   MICHAEL Restless :   More than half the days   MICHAEL Easily Annoyed/Irritable :   Nearly every day   MICHAEL Afraid :   Nearly every day   MICHAEL Total Screening Score :   19    MICHAEL Difficulty with Work, Home, Others :   Somewhat difficult   Tova Chen - 12/4/2020 10:00 AM CST

## 2022-02-15 NOTE — TELEPHONE ENCOUNTER
---------------------  From: Randa Mondragon CMA (Art Reese ([id number]_[facility]))   To: SARAH LEW    Sent: 2/1/2021 5:20:23 PM CST  Subject: medication     Hi Sarah,    Just wanting to touch base with you on a medication request we received today from [facility]. Are you wanting to refill/restart Contrave?    Please let us know at your convenience so we can pass request on to Dr. Farley to review.      Kind Regards,    BELLA Tolentino

## 2022-02-15 NOTE — PROGRESS NOTES
Chief Complaint      here for med check--Phentermine. also c/o low back pain, muscle strain.      History of Present Illness      Patient is here for her weight loss follow up.  She currently takes Phentermine-topiramate for weight loss.  She hasn't been losing weight like she would like to.  Her current dose is at its maximum.      Patient also has low back pain since Friday with pain being worse this morning.  She states that the pain is worse when walking or leaning back.  She denies any back injury.  She also denies urinary and bowel issues.      Review of Systems             See HPI.  All other review of systems negative.                    Physical Exam         Vitals & Measurements        T: 98   F (Tympanic)  HR: 72(Peripheral)  BP: 118/80         HT: 65 in  WT: 194.2 lb  BMI: 32.31             General:  Alert and oriented, No acute distress.  Not losing weight.            Eye:  Pupils are equal, round and reactive to light, Normal conjunctiva.              HENT:  Oral mucosa is moist.              Neck:  Supple.              Respiratory:  Respirations are non-labored.              Cardiovascular:  Normal rate, Regular rhythm, No edema.              Gastrointestinal:  Non-distended.              Musculoskeletal:  Normal gait.  Low back pain, left side.  Pain with left leg lift.            Integumentary:  Warm, No rash.              Psychiatric:  Cooperative, Appropriate mood & affect, Normal judgment.                   Assessment/Plan      1. Obesity (E66.09)         Will have patient try lorcaserin to see helps with her weight loss as Phentermine-topiramate was unsuccessful.  A prescription has been sent in.      2. Lt low back pain (M54.5)         Pain is more muscle related.  Will send in prescription for Cyclobenzaprine and Prednisone.      Jenny SALINAS MA, acted solely as a scribe for, and in the presence of Dr. Vlad Farley who performed the services.             Vlad SALINAS MD,  personally performed the services described in this documentation.  The documentation was scribed in my presence and is both accurate and complete.                    Patient Information      Name:  MARII LEW      Address:       [address] [place] [id number]      Sex: Female      YOB: 1975      Phone: [phone]      Emergency Contact: VARSHA LEW      MRN: [id number]      FIN: [id number]      Location: [facility]      Date of Service: 10/10/2018      Primary Care Physician:       Vlad Farley MD, [phone]      Attending Physician:       Vlad Farley MD, [phone]      Problem List/Past Medical History      Ongoing       Anxiety       Attention Deficit Disorder of Childhood without Mention of Hyperactivity       Family history of colon cancer       Female stress incontinence       Intermittent asthma       Major Depressive Disorder, Single Episode, Unspecified Degree       Mild recurrent major depression       Obesity       CELINA on CPAP      Historical        Pregnancy        Pregnancy        Pregnancy        Pregnancy        Pregnancy      Procedure/Surgical History        Colonoscopy (04/22/2011)           Comments:        Repeat in 5 years due to family hx        Sedation:   MAC        THAB x2                 Medications        Advair Diskus 500 mcg-50 mcg inhalation powder: 1 puff(s), inh, bid, 1 EA, 5 Refill(s).        Ventolin HFA 90 mcg/inh inhalation aerosol: 2 puff(s), inh, qid, PRN: as needed for wheezing, 3 EA, 2 Refill(s).        hydroCHLOROthiazide 12.5 mg oral tablet: 12.5 mg, 1 tab(s), po, daily, 30 tab(s), 2 Refill(s).        predniSONE 20 mg oral tablet: 20 mg, 1 tab(s), PO, Daily, for 7 day(s), 7 tab(s), 0 Refill(s).        cyclobenzaprine 10 mg oral tablet: 10 mg, 1 tab(s), PO, TID, for 10 day(s), PRN: for spasm, 30 tab(s), 0 Refill(s).        lorcaserin 20 mg oral tablet, extended release: See Instructions,  Take one by mouth daily, 30 tab(s), 2 Refill(s).                    Allergies      Zithromax (vomiting)      Social History       Smoking Status - 10/10/2018        Never smoker      Alcohol       Never, 05/17/2012      Employment and Education       [employer] School [employer], 05/17/2012      Home and Environment       Marital status: . Spouse/Partner name: Jeovanny. Lives with Children, Spouse., 06/12/2013      Nutrition and Health       Type of diet: Regular., 05/17/2012      Other       First menses age 12. Menstrual duration 3 days. Cycle interval 30 days. No abnormal pap smear., 05/17/2012      Sexual      Substance Abuse       Never, 03/30/2011      Tobacco       Never, 03/30/2011      Family History       Cancer: Mother.       Heart disease: Father.        Daughter: History is unknown        Son: History is unknown        Daughter: History is unknown        Brother: History is negative      Immunizations           Vaccine           Date           Status           Td          04/12/2004          Recorded  Signature Line  Signed and Authored by Vlad Farley MD on 10/11/2018 04:26 PM CDT         Charted Date:      October 11, 2018 2:15 PM CDT      Subject / Title:      Weight loss, back pain      Performed By:      Vlad Farley MD on October 11, 2018 4:26 PM CDT      Electronically Signed By:      Vlad Farley MD on October 11, 2018 4:26 PM CDT      Visit Information:      [id number], [facility], Outpatient, 10/10/2018 - 10/10/2018

## 2022-02-15 NOTE — TELEPHONE ENCOUNTER
---------------------  From: Vlad Farley MD   To: MARII LEW    Sent: 2/11/2020 10:36:25 AM CST  Subject: Patient Message - Results Notification     Your tests look good.  The TSH is slightly elevated.  I have sent a low dose diuretic to your pharmacy.  Take it once a day for the next couple of weeks and let me know if the swelling has decreased.    Results:  Date Result Name Ind Value Ref Range   2/6/2020 5:34 PM Sodium Level  138 mmol/L (135 - 146)   2/6/2020 5:34 PM Potassium Level  4.5 mmol/L (3.5 - 5.3)   2/6/2020 5:34 PM Chloride Level  102 mmol/L (98 - 110)   2/6/2020 5:34 PM CO2 Level  27 mmol/L (20 - 32)   2/6/2020 5:34 PM Glucose Level  97 mg/dL (65 - 99)   2/6/2020 5:34 PM BUN  16 mg/dL (7 - 25)   2/6/2020 5:34 PM Creatinine Level  0.87 mg/dL (0.50 - 1.10)   2/6/2020 5:34 PM eGFR  80 mL/min/1.73m2 (> OR = 60 - )   2/6/2020 5:34 PM Calcium Level  9.5 mg/dL (8.6 - 10.2)   2/6/2020 5:34 PM TSH ((H)) 4.67 mIU/L (0.5 - 4.50)

## 2022-02-15 NOTE — TELEPHONE ENCOUNTER
---------------------  From: MARII LEW  To: [facility]  Sent: 08/18/2020 10:04 a.m. CDT  Subject: Dr. Farley - Complications of wearing a mask  Since July I have been back to work full time.  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  I wear a mask at work and I don't normally mind it.  However, since wearing a mask for 7-8 hours a day, I have developed some upper respiratory issues that have made everyday life a bit more difficult.  I have started to feel a heaviness in my chest and often wheezing more than normal.  Everyday activities are somewhat harder to do because  my breathing to is more labored.  I'm exhausted at night when getting home I believe because I have to make such an effort during the day to change my breathing while wearing a mask.  I'm using an inhaler much more than normal during the day just trying to catch a breath.  Because of my asthma, I have been more aware of my surroundings and wearing a mask when out in public. I have been exposed to COVID-19 and both my  and daughter have both tested positive for COVID-19 and I have not had any symptoms.  I am not complaining about wearing a mask, only how wearing it has affected my health.  I am fine to wear a face shield, but my employer says I still need to wear a mask with it. Is there anything medically that you can do for me?  A stronger inhaler? Steroid? Or not wearing a mask?  I'm not sure if I needed to schedule and appt?  Please advise me on what some of  my options are.  Thank you for your time. - Anh Lew---------------------  From: Sophia Redmond CMA (Phone Messages Pool ([id number]_[facility]))   To: [employer] Message Pool ([id number]_[place]);     Sent: 8/18/2020 10:25:15 AM CDT  Subject: FW: Dr. Farley - Complications of wearing a mask---------------------  From: Susie DASH, Jenny ([employer] Message Pool ([id number]_[place]))   To: Vlad Farley MD;     Sent: 8/18/2020 10:28:13 AM CDT  Subject: FW: Dr. Farley - Complications of wearing a mask---------------------  From: Vlad Farley MD   To: "[employer] Pool ([id number]_[place]);     Sent: 8/18/2020 12:51:43 PM CDT  Subject: RE: Dr. Farley - Complications of wearing a mask     Advise that she comply with school guidelines and wear a mask.  If absolutely can't wear a mask will need to work from home.---------------------  From: Jenny Richards MA ([employer] Message Pool ([id number]_[facility]))   To: MARII LEW    Sent: 8/18/2020 1:39:10 PM CDT  Subject: FW: Dr. Farley - Complications of wearing a mask     Good Afternoon, Dr. Miguelito Smith received your message from this morning.  Please see his recommendation.  If you have any further concerns, please let us know.    Sincerely,      Jenny GUPTA CMA/Dr. Farley

## 2022-02-15 NOTE — PROGRESS NOTES
Chief Complaint    c/o increased swelling in feet and legs since last summer, getting worse.  was worse over [place].  History of Present Illness      Patient is here for increased edema to her lower legs and feet since last summer.  She denies any changes in activity or with medication.  The swelling goes down during the night, less swelling in the mornings.  The swelling was worse over [place] and not improving.  She denies taking any new supplements.  She does feel bloated after she eats, has been trying to lose weight, denies soda consumption, does drink plenty of water, does watch her diet.  Denies abnormal menstrual cycles.  Review of Systems           See HPI.  All other review of systems negative.              Physical Exam   Vitals & Measurements    T: 99.6   F (Tympanic)  HR: 72(Peripheral)  BP: 114/68     HT: 65 in  WT: 213.6 lb  BMI: 35.54           General:  Alert and oriented, No acute distress.            Eye:  Pupils are equal, round and reactive to light, Normal conjunctiva.            HENT:  Oral mucosa is moist.            Neck:  Supple.            Respiratory:  Respirations are non-labored, LS clear.          Cardiovascular:  Normal rate, Regular rhythm, No edema.            Gastrointestinal:  Non-distended.            Musculoskeletal:  Normal gait.  Lower legs and feet.: +pitting edema.          Integumentary:  Warm, No rash.            Psychiatric:  Cooperative, Appropriate mood & affect, Normal judgment.             Assessment/Plan       1. Edema of both lower extremities (R60.0)         BMP, TSH today.  Will consider low dose diuretic, HCTZ, if labs are normal.       Orders:         fluticasone-salmeterol, 1 puff(s), inh, bid, # 1 EA, 5 Refill(s), Type: Maintenance, Pharmacy: [facility], 1 puff(s) inh bid, (Completed)         hydroCHLOROthiazide, 1 tab(s) ( 12.5 mg ), po, daily, # 30 tab(s), 2 Refill(s), Type: Maintenance, Pharmacy: [facility], 1 tab(s) po daily, (Completed)          Basic Metabolic Panel* ([facility]), Specimen Type: Serum, Collection Date: 02/06/20 17:26:00 CST         TSH* ([facility]), Specimen Type: Serum, Collection Date: 02/06/20 17:26:00 CST      Jenny SALINAS MA, acted solely as a scribe for, and in the presence of Dr. Vlad Farley who performed the services.             Vlad SALINAS MD, personally performed the services described in this documentation.  The documentation was scribed in my presence and is both accurate and complete.                Patient Information     Name:MARII LEW      Address:      [address] [place] [id number]     Sex:Female     YOB: 1975     [phone]     Emergency Contact:VARSHA LEW     MRN:[id number]     FIN:[id number]     Location:[facility]     Date of Service:02/06/2020      Primary Care Physician:       Vlad Farley MD, [phone]      Attending Physician:       Vlad Farley MD, [phone]  Problem List/Past Medical History    Ongoing     Anxiety     Attention Deficit Disorder of Childhood without Mention of Hyperactivity     Family history of colon cancer     Female stress incontinence     Intermittent asthma     Major Depressive Disorder, Single Episode, Unspecified Degree     Mild recurrent major depression     Obesity     CELINA on CPAP    Historical     Pregnancy     Pregnancy     Pregnancy     Pregnancy     Pregnancy  Procedure/Surgical History     Colonoscopy (04/22/2011)      Comments: Repeat in 5 years due to family hx. Sedation:   MAC.     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 Spouse/Partner name: Jeovanny. Lives with Children, Spouse., 06/12/2013     Nutrition/Health      Type of diet: Regular., 05/17/2012     Other      First menses age 12. Menstrual duration 3 days. Cycle interval 30 days. No abnormal pap smear., 05/17/2012     Sexual     Substance Abuse      Never, 03/30/2011     Tobacco      Never, 03/30/2011  Family History    Cancer: Mother.    Heart disease: Father.    Brother: History is negative    Daughter: History is unknown    Son: History is unknown    Daughter: History is unknown  Immunizations      Vaccine Date Status          Td 04/12/2004 Recorded

## 2022-02-15 NOTE — PROGRESS NOTES
Chief Complaint    Patient presents for med check-weight loss medication  History of Present Illness      Here for follow up on weight loss.  No side effects from Qsymia.  She has concerns about swelling in her legs and hands.  This has persisted despite a 20 lb weight loss.  Review of Systems          ROS reviewed an negative except for symptoms noted in HPI.            Physical Exam   Vitals & Measurements    T: 98.1(Tympanic)  HR: 72(Peripheral)  BP: 120/76     HT: 65 in  WT: 196 lb  BMI: 32.61           General:  Alert and oriented, No acute distress.            Eye:  Normal conjunctiva.            HENT:  Normocephalic          Respiratory: Respirations are non-labored.            Cardiovascular:  Normal rate          Psychiatric:  Cooperative, Appropriate mood & affect.   Assessment/Plan       Obesity         continue with current medication, recheck in 2 months         Ordered:                Peripheral edema         check BMP, trial of low dose HCTZ         Ordered:          Basic Metabolic Panel* ([facility]), Specimen Type: Serum, Collection Date: 02/22/18 16:45:00 CST                Orders:         hydroCHLOROthiazide, 1 tab(s) ( 12.5 mg ), po, daily, # 30 tab(s), 2 Refill(s), Type: Maintenance, Pharmacy: [facility], 1 tab(s) po daily         phentermine-topiramate, 1 cap(s), po, qam, # 30 cap(s), 1 Refill(s), Type: Maintenance, Pharmacy: [facility], 1 cap(s) po qam  Patient Information     Name:MARII LEW      Address:      [address] [place] [id number]     Sex:Female     YOB: 1975     [phone]     Emergency Contact:VARSHA LEW     MRN:[id number]     FIN:[id number]     Location:[facility]     Date of Service:02/22/2018      Primary Care Physician:       Vlad Farley MD, [phone]  Problem List/Past Medical History    Ongoing     Anxiety     Attention Deficit Disorder of Childhood without Mention of Hyperactivity      Family history of colon cancer     Female stress incontinence     Intermittent asthma     Major Depressive Disorder, Single Episode, Unspecified Degree     Mild recurrent major depression     Obesity     CELINA on CPAP    Historical     Pregnancy     Pregnancy     Pregnancy     Pregnancy     Pregnancy  Procedure/Surgical History     Colonoscopy (04/22/2011)     THAB x2  Medications     Ventolin HFA 90 mcg/inh inhalation aerosol: 2 puff(s), inh, qid, PRN: as needed for wheezing, 3 EA, 2 Refill(s).     Advair Diskus 500 mcg-50 mcg inhalation powder: 1 puff(s), inh, bid, 1 EA, 5 Refill(s).     hydroCHLOROthiazide 12.5 mg oral tablet: 12.5 mg, 1 tab(s), po, daily, 30 tab(s), 2 Refill(s).     phentermine-topiramate 3.75 mg-23 mg oral capsule, extended release: 1 cap(s), po, qam, for 14 day(s), 14 cap(s), 0 Refill(s).     phentermine-topiramate 7.5 mg-46 mg oral capsule, extended release: 1 cap(s), po, qam, 30 cap(s), 1 Refill(s).      Allergies    Zithromax (vomiting)  Social History    Smoking Status - 02/22/2018     Never smoker     Alcohol - 06/12/2013      Never     Employment and Education - 06/12/2013      [employer] School Student [employer]     Home and Environment - 06/12/2013      Marital status: . Spouse/Partner name: Jeovanny. Lives with Children, Spouse.     Nutrition and Health - 06/12/2013      Type of diet: Regular.     Other - 06/12/2013      First menses age 12. Menstrual duration 3 days. Cycle interval 30 days. No abnormal pap smear.     Sexual - 06/12/2013     Substance Abuse - 06/12/2013      Never     Tobacco - 06/12/2013      Never  Family History    Cancer: Mother.    Heart disease: Father.  Immunizations      Vaccine Date Status      Td 04/12/2004 Recorded  Lab Results   Results (Last 90 days)             Laboratory                  Chemistry                       General Chemistry                            A/G Ratio:      2.1   (12/06/17 03:58 PM CST)                                                                                                                                        ALT/SGPT:      H 33 unit/L (Range 6 - 29)  (12/06/17 03:58 PM CST)                                                                                                                                       AST/SGOT:      20 unit/L  (12/06/17 03:58 PM CST)                                                                                                                                       Albumin Level:      4.6 gm/dL  (12/06/17 03:58 PM CST)                                                                                                                                       Alkaline Phosphatase:      44 unit/L  (12/06/17 03:58 PM CST)                                                                                                                                       Bilirubin Direct:      0.1 mg/dL  (12/06/17 03:58 PM CST)                                                                                                                                       Bilirubin Indirect:      0.7   (12/06/17 03:58 PM CST)                                                                                                                                       Bilirubin Total:      0.8 mg/dL  (12/06/17 03:58 PM CST)                                                                                                                                       Globulin:      2.2   (12/06/17 03:58 PM CST)                                                                                                                                       Protein Total                                     (12/06/17 03:58 PM CST)                                                                                                                                             6.8 gm/dL  (12/06/17 03:58 PM CST)

## 2022-10-17 ENCOUNTER — TELEPHONE (OUTPATIENT)
Dept: FAMILY MEDICINE | Facility: CLINIC | Age: 47
End: 2022-10-17

## 2022-10-17 ENCOUNTER — OFFICE VISIT (OUTPATIENT)
Dept: FAMILY MEDICINE | Facility: CLINIC | Age: 47
End: 2022-10-17
Payer: COMMERCIAL

## 2022-10-17 VITALS
BODY MASS INDEX: 35.58 KG/M2 | WEIGHT: 213.8 LBS | HEART RATE: 73 BPM | TEMPERATURE: 98.4 F | SYSTOLIC BLOOD PRESSURE: 110 MMHG | DIASTOLIC BLOOD PRESSURE: 73 MMHG

## 2022-10-17 DIAGNOSIS — K57.32 DIVERTICULITIS OF COLON: Primary | ICD-10-CM

## 2022-10-17 DIAGNOSIS — R10.32 LLQ ABDOMINAL PAIN: ICD-10-CM

## 2022-10-17 LAB
ALBUMIN UR-MCNC: 30 MG/DL
APPEARANCE UR: CLEAR
BACTERIA #/AREA URNS HPF: ABNORMAL /HPF
BASOPHILS # BLD AUTO: 0 10E3/UL (ref 0–0.2)
BASOPHILS NFR BLD AUTO: 0 %
BILIRUB UR QL STRIP: ABNORMAL
COLOR UR AUTO: YELLOW
EOSINOPHIL # BLD AUTO: 0.1 10E3/UL (ref 0–0.7)
EOSINOPHIL NFR BLD AUTO: 2 %
ERYTHROCYTE [DISTWIDTH] IN BLOOD BY AUTOMATED COUNT: 13.3 % (ref 10–15)
GLUCOSE UR STRIP-MCNC: NEGATIVE MG/DL
HCG UR QL: NEGATIVE
HCT VFR BLD AUTO: 41.9 % (ref 35–47)
HGB BLD-MCNC: 13.6 G/DL (ref 11.7–15.7)
HGB UR QL STRIP: ABNORMAL
HYALINE CASTS #/AREA URNS LPF: ABNORMAL /LPF
IMM GRANULOCYTES # BLD: 0 10E3/UL
IMM GRANULOCYTES NFR BLD: 0 %
KETONES UR STRIP-MCNC: ABNORMAL MG/DL
LEUKOCYTE ESTERASE UR QL STRIP: NEGATIVE
LYMPHOCYTES # BLD AUTO: 1.6 10E3/UL (ref 0.8–5.3)
LYMPHOCYTES NFR BLD AUTO: 22 %
MCH RBC QN AUTO: 28.2 PG (ref 26.5–33)
MCHC RBC AUTO-ENTMCNC: 32.5 G/DL (ref 31.5–36.5)
MCV RBC AUTO: 87 FL (ref 78–100)
MONOCYTES # BLD AUTO: 0.5 10E3/UL (ref 0–1.3)
MONOCYTES NFR BLD AUTO: 6 %
MUCOUS THREADS #/AREA URNS LPF: PRESENT /LPF
NEUTROPHILS # BLD AUTO: 5.1 10E3/UL (ref 1.6–8.3)
NEUTROPHILS NFR BLD AUTO: 70 %
NITRATE UR QL: NEGATIVE
PH UR STRIP: 5.5 [PH] (ref 5–7)
PLATELET # BLD AUTO: 341 10E3/UL (ref 150–450)
RBC # BLD AUTO: 4.83 10E6/UL (ref 3.8–5.2)
RBC #/AREA URNS AUTO: ABNORMAL /HPF
SP GR UR STRIP: 1.02 (ref 1–1.03)
SQUAMOUS #/AREA URNS AUTO: ABNORMAL /LPF
UROBILINOGEN UR STRIP-ACNC: 1 E.U./DL
WBC # BLD AUTO: 7.2 10E3/UL (ref 4–11)
WBC #/AREA URNS AUTO: ABNORMAL /HPF

## 2022-10-17 PROCEDURE — 80053 COMPREHEN METABOLIC PANEL: CPT | Performed by: FAMILY MEDICINE

## 2022-10-17 PROCEDURE — 81001 URINALYSIS AUTO W/SCOPE: CPT | Performed by: FAMILY MEDICINE

## 2022-10-17 PROCEDURE — 81025 URINE PREGNANCY TEST: CPT | Performed by: FAMILY MEDICINE

## 2022-10-17 PROCEDURE — 85025 COMPLETE CBC W/AUTO DIFF WBC: CPT | Mod: QW | Performed by: FAMILY MEDICINE

## 2022-10-17 PROCEDURE — 99214 OFFICE O/P EST MOD 30 MIN: CPT | Performed by: FAMILY MEDICINE

## 2022-10-17 PROCEDURE — 36415 COLL VENOUS BLD VENIPUNCTURE: CPT | Performed by: FAMILY MEDICINE

## 2022-10-17 RX ORDER — ALBUTEROL SULFATE 90 UG/1
2 AEROSOL, METERED RESPIRATORY (INHALATION) PRN
COMMUNITY
Start: 2020-12-02

## 2022-10-17 RX ORDER — LEVOTHYROXINE SODIUM 50 UG/1
1 TABLET ORAL DAILY
COMMUNITY
Start: 2022-09-14 | End: 2024-01-24 | Stop reason: DRUGHIGH

## 2022-10-17 NOTE — TELEPHONE ENCOUNTER
Called and notified patient of normal U/S results and Dr. Schmidt will be calling her shortly to discuss any questions.

## 2022-10-17 NOTE — PROGRESS NOTES
Clinical Decision Making:    At the end of the encounter, I discussed results, diagnosis, medications. Discussed red flags for immediate return to clinic/ER, as well as indications for follow up if no improvement. Patient understood and agreed to plan. Patient was stable for discharge.      ICD-10-CM    1. Diverticulitis of colon  K57.32       2. LLQ abdominal pain  R10.32 CBC with platelets and differential     Comprehensive metabolic panel (BMP + Alb, Alk Phos, ALT, AST, Total. Bili, TP)     UA macro with reflex to Microscopic and Culture - Clinc Collect     HCG qualitative urine     US Pelvic Complete with Transvaginal     CBC with platelets and differential     Comprehensive metabolic panel (BMP + Alb, Alk Phos, ALT, AST, Total. Bili, TP)     HCG qualitative urine     Urine Microscopic     CT Abdomen Pelvis w Contrast     CANCELED: CT Abdomen Pelvis w/o Contrast        CMP pending  Remainder of labs and ultrasound were within normal limits  CT scan of abdomen and pelvis was then ordered  CT showed diverticulitis of transverse and sigmoid colon.  Discussed these results with the patient over the phone.  Treating with augmentin twice daily for 7 days.  Clear liquid for diet and then advance to a bland diet and then advance as tolerated  Follow up if not improving as anticipated.  Pt verbalized understanding.      There are no Patient Instructions on file for this visit.   No follow-ups on file.      chief complaint    HPI:  Sarah Garza is a 47 year old female who presents today complaining of dull left lower quadrant pain for the last few weeks.  At first she thought it might be ovulation pain because she gets that frequently.  3 days ago she had the start of her menstrual cycle and it was extremely heavy.  The last 2 days was very light.  Since then the left lower quadrant pain has been worsening.  It hurts to move and it hurts to sit.  It occasionally radiates into her back.  She denies diarrhea,  constipation, blood in the stool.  She denies hematuria, urinary urgency or frequency.  No concern about sexually transmitted infections.  No vaginal itching, discharge, odor.  No fevers or chills.  No nausea or vomiting.    For most of her life she had very regular periods that would last 3 days.  The last 10 months her menses have been extremely heavy where she at times is bleeding through her clothes at night.  They also have been irregular.    History obtained from the patient.    Problem List:  There are no relevant problems documented for this patient.      History reviewed. No pertinent past medical history.    Social History     Tobacco Use     Smoking status: Never     Smokeless tobacco: Never   Substance Use Topics     Alcohol use: Not on file       Review of systems  negative except listed in HPI    Vitals:    10/17/22 1241   BP: 110/73   BP Location: Right arm   Pulse: 73   Temp: 98.4  F (36.9  C)   Weight: 97 kg (213 lb 12.8 oz)       Physical Exam  Vitals noted and within normal limits  In general she is alert, oriented, and in no acute distress  Neck with no thyromegaly  Heart regular rate and rhythm with no murmurs  Lungs clear to auscultation bilaterally  Back with no CVA tenderness  Abdomen with normal bowel sounds, soft, nondistended.  There is tenderness in the left lower quadrant.  There is mild tenderness in the hypogastric area and the epigastric area.  There is no tenderness in the right upper quadrant or right lower quadrant.  CBC: Within normal limits  CMP pending  UPT: Negative  UA: Some blood in the urine but patient is menstruating  Vaginal ultrasound: Within normal limits  Results for orders placed or performed in visit on 10/17/22   UA macro with reflex to Microscopic and Culture - Clinc Collect     Status: Abnormal    Specimen: Urine, Clean Catch   Result Value Ref Range    Color Urine Yellow Colorless, Straw, Light Yellow, Yellow    Appearance Urine Clear Clear    Glucose Urine  Negative Negative mg/dL    Bilirubin Urine Small (A) Negative    Ketones Urine Trace (A) Negative mg/dL    Specific Gravity Urine 1.020 1.003 - 1.035    Blood Urine Large (A) Negative    pH Urine 5.5 5.0 - 7.0    Protein Albumin Urine 30 (A) Negative mg/dL    Urobilinogen Urine 1.0 0.2, 1.0 E.U./dL    Nitrite Urine Negative Negative    Leukocyte Esterase Urine Negative Negative   HCG qualitative urine     Status: Normal   Result Value Ref Range    hCG Urine Qualitative Negative Negative   CBC with platelets and differential     Status: None   Result Value Ref Range    WBC Count 7.2 4.0 - 11.0 10e3/uL    RBC Count 4.83 3.80 - 5.20 10e6/uL    Hemoglobin 13.6 11.7 - 15.7 g/dL    Hematocrit 41.9 35.0 - 47.0 %    MCV 87 78 - 100 fL    MCH 28.2 26.5 - 33.0 pg    MCHC 32.5 31.5 - 36.5 g/dL    RDW 13.3 10.0 - 15.0 %    Platelet Count 341 150 - 450 10e3/uL    % Neutrophils 70 %    % Lymphocytes 22 %    % Monocytes 6 %    % Eosinophils 2 %    % Basophils 0 %    % Immature Granulocytes 0 %    Absolute Neutrophils 5.1 1.6 - 8.3 10e3/uL    Absolute Lymphocytes 1.6 0.8 - 5.3 10e3/uL    Absolute Monocytes 0.5 0.0 - 1.3 10e3/uL    Absolute Eosinophils 0.1 0.0 - 0.7 10e3/uL    Absolute Basophils 0.0 0.0 - 0.2 10e3/uL    Absolute Immature Granulocytes 0.0 <=0.4 10e3/uL   Urine Microscopic     Status: Abnormal   Result Value Ref Range    Bacteria Urine Moderate (A) None Seen /HPF    RBC Urine 25-50 (A) 0-2 /HPF /HPF    WBC Urine 0-5 0-5 /HPF /HPF    Squamous Epithelials Urine Few (A) None Seen /LPF    Mucus Urine Present (A) None Seen /LPF    Hyaline Casts Urine 0-2 (A) None Seen /LPF    Narrative    Urine Culture not indicated   CBC with platelets and differential     Status: None    Narrative    The following orders were created for panel order CBC with platelets and differential.  Procedure                               Abnormality         Status                     ---------                               -----------         ------                      CBC with platelets and d...[[id number]]                      Final result                 Please view results for these tests on the individual orders.     CT Abd/pelvis: diverticulitis of transverse and sigmoid colon.      Answers for HPI/ROS submitted by the patient on 10/17/2022  How many servings of fruits and vegetables do you eat daily?: 4 or more  On average, how many sweetened beverages do you drink each day (Examples: soda, juice, sweet tea, etc.  Do NOT count diet or artificially sweetened beverages)?: 0  How many minutes a day do you exercise enough to make your heart beat faster?: 10 to 19  How many days a week do you exercise enough to make your heart beat faster?: 3 or less  How many days per week do you miss taking your medication?: 0  What is the reason for your visit today?: abdominal pain  When did your symptoms begin?: 1-2 weeks ago

## 2022-10-18 ENCOUNTER — TELEPHONE (OUTPATIENT)
Dept: FAMILY MEDICINE | Facility: CLINIC | Age: 47
End: 2022-10-18

## 2022-10-18 LAB
ALBUMIN SERPL BCG-MCNC: 4.5 G/DL (ref 3.5–5.2)
ALP SERPL-CCNC: 70 U/L (ref 35–104)
ALT SERPL W P-5'-P-CCNC: 50 U/L (ref 10–35)
ANION GAP SERPL CALCULATED.3IONS-SCNC: 17 MMOL/L (ref 7–15)
AST SERPL W P-5'-P-CCNC: 33 U/L (ref 10–35)
BILIRUB SERPL-MCNC: 0.9 MG/DL
BUN SERPL-MCNC: 13.5 MG/DL (ref 6–20)
CALCIUM SERPL-MCNC: 10.2 MG/DL (ref 8.6–10)
CHLORIDE SERPL-SCNC: 95 MMOL/L (ref 98–107)
CREAT SERPL-MCNC: 1.02 MG/DL (ref 0.51–0.95)
DEPRECATED HCO3 PLAS-SCNC: 27 MMOL/L (ref 22–29)
GFR SERPL CREATININE-BSD FRML MDRD: 68 ML/MIN/1.73M2
GLUCOSE SERPL-MCNC: 86 MG/DL (ref 70–99)
POTASSIUM SERPL-SCNC: 3.3 MMOL/L (ref 3.4–5.3)
PROT SERPL-MCNC: 8 G/DL (ref 6.4–8.3)
SODIUM SERPL-SCNC: 139 MMOL/L (ref 136–145)

## 2022-10-18 NOTE — TELEPHONE ENCOUNTER
10-18-22  Dorene called from [facility] they need the CT order placed 10-17-22 faxed   FAX: [phone]  ATTN: radiology   greg

## 2022-10-18 NOTE — TELEPHONE ENCOUNTER
CT order reprinted and faxed th [facility] attn:  Vanessa [phone]  Clive Bruno CMA

## 2022-10-18 NOTE — TELEPHONE ENCOUNTER
Advised patient of  Dr. Schmidt's message, patient verbalized understanding and will schedule office visit with ADENIKE.

## 2022-10-18 NOTE — TELEPHONE ENCOUNTER
----- Message from Dulce Maria Schmidt MD sent at 10/18/2022  1:00 PM CDT -----  Please call the patient and let her know the rest of the labs came back today.  Her potassium is a little low.  Add gatorade or pedialyte to fluid intake.  Have her add a banana and/or avocado per day when she is eating more.  Kidney function is a little low likely because of dehydration but I would like her to come in next week for a recheck.  Please have her schedule an appointment - looks like her primary is Dr Farley.  Thank you!

## 2022-11-08 ENCOUNTER — TELEPHONE (OUTPATIENT)
Dept: FAMILY MEDICINE | Facility: CLINIC | Age: 47
End: 2022-11-08

## 2022-11-08 DIAGNOSIS — K57.32 DIVERTICULITIS OF COLON: Primary | ICD-10-CM

## 2022-11-08 NOTE — TELEPHONE ENCOUNTER
"11-8-22  Medication Question or Refill      What medication are you calling about (include dose and sig)?: lupe    Who prescribed the medication?: rohith    Do you need a refill? Yes:     When did you use the medication last? Only when pt has diverticulitis     Patient offered an appointment? No    Do you have any questions or concerns?  Yes: pt called stated she was last seen on 10-17 w/ Dr Newberry for diverticulitis, dr newberry prescribed \"amoxpot\" (per pt that's the name) patient states she has diverticulitis again:  Symptoms x3days  Lower left abdominal pain  Cramping    [place]  Body aches    Preferred Pharmacy:   [address] [address]  Phone: [phone] Fax: [phone]      Okay to leave a detailed message?: Yes at Home number on file [phone] (home)   "

## 2023-06-29 ENCOUNTER — TELEPHONE (OUTPATIENT)
Dept: FAMILY MEDICINE | Facility: CLINIC | Age: 48
End: 2023-06-29

## 2023-06-29 NOTE — TELEPHONE ENCOUNTER
Patient Quality Outreach    Patient is due for the following:   Cervical Cancer Screening - PAP Needed  Physical Preventive Adult Physical    Next Steps:   Schedule a Adult Preventative    Type of outreach:    Sent letter.      Questions for provider review:    None           Jenny Richards MA

## 2023-11-30 ENCOUNTER — TRANSFERRED RECORDS (OUTPATIENT)
Dept: MULTI SPECIALTY CLINIC | Facility: CLINIC | Age: 48
End: 2023-11-30

## 2023-11-30 LAB — PAP SMEAR - HIM PATIENT REPORTED: NORMAL

## 2024-01-24 ENCOUNTER — OFFICE VISIT (OUTPATIENT)
Dept: FAMILY MEDICINE | Facility: CLINIC | Age: 49
End: 2024-01-24
Payer: COMMERCIAL

## 2024-01-24 VITALS
WEIGHT: 215.5 LBS | BODY MASS INDEX: 35.9 KG/M2 | RESPIRATION RATE: 18 BRPM | DIASTOLIC BLOOD PRESSURE: 70 MMHG | TEMPERATURE: 98 F | OXYGEN SATURATION: 97 % | HEART RATE: 80 BPM | SYSTOLIC BLOOD PRESSURE: 120 MMHG | HEIGHT: 65 IN

## 2024-01-24 DIAGNOSIS — R10.9 RIGHT FLANK PAIN: Primary | ICD-10-CM

## 2024-01-24 DIAGNOSIS — R30.0 DYSURIA: ICD-10-CM

## 2024-01-24 LAB
ALBUMIN SERPL BCG-MCNC: 4.5 G/DL (ref 3.5–5.2)
ALBUMIN UR-MCNC: NEGATIVE MG/DL
ALP SERPL-CCNC: 49 U/L (ref 40–150)
ALT SERPL W P-5'-P-CCNC: 27 U/L (ref 0–50)
ANION GAP SERPL CALCULATED.3IONS-SCNC: 12 MMOL/L (ref 7–15)
APPEARANCE UR: CLEAR
AST SERPL W P-5'-P-CCNC: 20 U/L (ref 0–45)
BASOPHILS # BLD AUTO: 0 10E3/UL (ref 0–0.2)
BASOPHILS NFR BLD AUTO: 0 %
BILIRUB SERPL-MCNC: 0.7 MG/DL
BILIRUB UR QL STRIP: NEGATIVE
BUN SERPL-MCNC: 16.2 MG/DL (ref 6–20)
CALCIUM SERPL-MCNC: 9.3 MG/DL (ref 8.6–10)
CHLORIDE SERPL-SCNC: 108 MMOL/L (ref 98–107)
COLOR UR AUTO: YELLOW
CREAT SERPL-MCNC: 0.89 MG/DL (ref 0.51–0.95)
DEPRECATED HCO3 PLAS-SCNC: 22 MMOL/L (ref 22–29)
EGFRCR SERPLBLD CKD-EPI 2021: 79 ML/MIN/1.73M2
EOSINOPHIL # BLD AUTO: 0.1 10E3/UL (ref 0–0.7)
EOSINOPHIL NFR BLD AUTO: 3 %
ERYTHROCYTE [DISTWIDTH] IN BLOOD BY AUTOMATED COUNT: 14.1 % (ref 10–15)
GLUCOSE SERPL-MCNC: 99 MG/DL (ref 70–99)
GLUCOSE UR STRIP-MCNC: NEGATIVE MG/DL
HCT VFR BLD AUTO: 43.1 % (ref 35–47)
HGB BLD-MCNC: 13.9 G/DL (ref 11.7–15.7)
HGB UR QL STRIP: NEGATIVE
IMM GRANULOCYTES # BLD: 0 10E3/UL
IMM GRANULOCYTES NFR BLD: 0 %
KETONES UR STRIP-MCNC: NEGATIVE MG/DL
LEUKOCYTE ESTERASE UR QL STRIP: NEGATIVE
LYMPHOCYTES # BLD AUTO: 1.7 10E3/UL (ref 0.8–5.3)
LYMPHOCYTES NFR BLD AUTO: 29 %
MCH RBC QN AUTO: 28.4 PG (ref 26.5–33)
MCHC RBC AUTO-ENTMCNC: 32.3 G/DL (ref 31.5–36.5)
MCV RBC AUTO: 88 FL (ref 78–100)
MONOCYTES # BLD AUTO: 0.3 10E3/UL (ref 0–1.3)
MONOCYTES NFR BLD AUTO: 5 %
NEUTROPHILS # BLD AUTO: 3.6 10E3/UL (ref 1.6–8.3)
NEUTROPHILS NFR BLD AUTO: 63 %
NITRATE UR QL: NEGATIVE
PH UR STRIP: 6 [PH] (ref 5–7)
PLATELET # BLD AUTO: 274 10E3/UL (ref 150–450)
POTASSIUM SERPL-SCNC: 4.1 MMOL/L (ref 3.4–5.3)
PROT SERPL-MCNC: 7.4 G/DL (ref 6.4–8.3)
RBC # BLD AUTO: 4.9 10E6/UL (ref 3.8–5.2)
SODIUM SERPL-SCNC: 142 MMOL/L (ref 135–145)
SP GR UR STRIP: >=1.03 (ref 1–1.03)
UROBILINOGEN UR STRIP-ACNC: 0.2 E.U./DL
WBC # BLD AUTO: 5.7 10E3/UL (ref 4–11)

## 2024-01-24 PROCEDURE — 99214 OFFICE O/P EST MOD 30 MIN: CPT | Performed by: PHYSICIAN ASSISTANT

## 2024-01-24 PROCEDURE — 81003 URINALYSIS AUTO W/O SCOPE: CPT | Mod: QW | Performed by: PHYSICIAN ASSISTANT

## 2024-01-24 PROCEDURE — 80053 COMPREHEN METABOLIC PANEL: CPT | Performed by: PHYSICIAN ASSISTANT

## 2024-01-24 PROCEDURE — 36415 COLL VENOUS BLD VENIPUNCTURE: CPT | Performed by: PHYSICIAN ASSISTANT

## 2024-01-24 PROCEDURE — 85025 COMPLETE CBC W/AUTO DIFF WBC: CPT | Mod: QW | Performed by: PHYSICIAN ASSISTANT

## 2024-01-24 RX ORDER — LEVOTHYROXINE SODIUM 88 UG/1
88 TABLET ORAL DAILY
COMMUNITY
Start: 2024-01-09

## 2024-01-24 RX ORDER — HYDROCHLOROTHIAZIDE 25 MG/1
25 TABLET ORAL DAILY
COMMUNITY
Start: 2023-12-11

## 2024-01-24 RX ORDER — TOPIRAMATE 50 MG/1
50 TABLET, FILM COATED ORAL 2 TIMES DAILY
COMMUNITY

## 2024-01-24 ASSESSMENT — ENCOUNTER SYMPTOMS: BACK PAIN: 1

## 2024-01-24 NOTE — PROGRESS NOTES
"  Assessment & Plan     Right flank pain  Workup in process  - UA Macroscopic with reflex to Microscopic and Culture - Clinic Collect  - CBC with Platelets & Differential  - Comprehensive metabolic panel (BMP + Alb, Alk Phos, ALT, AST, Total. Bili, TP)  - US Renal Complete Non-Vascular    Dysuria  Workup in process will follow-up after we get results  - UA Macroscopic with reflex to Microscopic and Culture - Clinic Collect  - CBC with Platelets & Differential  - Comprehensive metabolic panel (BMP + Alb, Alk Phos, ALT, AST, Total. Bili, TP)  - US Renal Complete Non-Vascular  May need referral to urology            BMI  Estimated body mass index is 35.86 kg/m  as calculated from the following:    Height as of this encounter: 1.651 m (5' 5\").    Weight as of this encounter: 97.8 kg (215 lb 8 oz).           Tonny Smith is a 49 year old, presenting for the following health issues:  Back Pain (Lower back pain for the past 4 months )        1/24/2024     9:08 AM   Additional Questions   Roomed by BELLA Sanford     Via the [employer] Maintenance questionnaire, the patient has reported the following services have been completed -Cervical Cancer Screening, this information has been sent to the abstraction team.  49-year-old consult clinic with complaint of right flank pain.  She has not felt well since the fall has felt like she has had recurrent UTIs she has been treated a couple of times at an outside facility without much improvement  She has had no weight loss  She has had no injury to her back  Her  is a chiropractor he is not certain if this is all musculoskeletal  She has had no weakness of the lower extremities  She has had no incontinence  Mild frequency no urgency is mild dysuria  No fever no chills  She sleeps well does not have nighttime symptoms she has not had problems with this previously she    History of Present Illness       Back Pain:  She presents for follow up of back pain. Patient's back pain is " "a chronic problem.  Location of back pain:  Right lower back, left lower back and right hip  Description of back pain: dull ache and shooting  Back pain spreads: right buttocks and left buttocks    Since patient first noticed back pain, pain is: always present, but gets better and worse  Does back pain interfere with her job:  No       She eats 0-1 servings of fruits and vegetables daily.She consumes 2 sweetened beverage(s) daily.She exercises with enough effort to increase her heart rate 10 to 19 minutes per day.  She exercises with enough effort to increase her heart rate 3 or less days per week.   She is taking medications regularly.                     Objective    /70 (BP Location: Right arm, Patient Position: Sitting, Cuff Size: Adult Large)   Pulse 80   Temp 98  F (36.7  C) (Tympanic)   Resp 18   Ht 1.651 m (5' 5\")   Wt 97.8 kg (215 lb 8 oz)   LMP 12/30/2023 (Approximate)   SpO2 97%   BMI 35.86 kg/m    Body mass index is 35.86 kg/m .  Physical Exam alert attentive no acute distress  Lungs clear ventilated  Cardiovascular and rhythm  Chest some tenderness in the right CVA region with BUD swelling no tenderness over the spinous processes she has good range of motion of the lumbar spine without limitation minimal discomfort with lateral rotation  Strength 5/5 lower extremities bilaterally  Reflexes +2 patellar +1 Achilles bilaterally  Abdomen soft nontender no palpable mass organomegaly noted              Signed Electronically by: ELEN Mays    "

## 2024-03-10 ENCOUNTER — HEALTH MAINTENANCE LETTER (OUTPATIENT)
Age: 49
End: 2024-03-10

## 2024-09-25 PROBLEM — K76.0 HEPATIC FIBROSIS DUE TO NONALCOHOLIC FATTY LIVER DISEASE: Status: ACTIVE | Noted: 2024-03-11

## 2024-09-25 PROBLEM — K74.00 HEPATIC FIBROSIS DUE TO NONALCOHOLIC FATTY LIVER DISEASE: Status: ACTIVE | Noted: 2024-03-11

## 2024-09-26 ENCOUNTER — ANCILLARY PROCEDURE (OUTPATIENT)
Dept: GENERAL RADIOLOGY | Facility: CLINIC | Age: 49
End: 2024-09-26
Attending: FAMILY MEDICINE
Payer: COMMERCIAL

## 2024-09-26 ENCOUNTER — OFFICE VISIT (OUTPATIENT)
Dept: FAMILY MEDICINE | Facility: CLINIC | Age: 49
End: 2024-09-26
Payer: COMMERCIAL

## 2024-09-26 VITALS
OXYGEN SATURATION: 96 % | SYSTOLIC BLOOD PRESSURE: 115 MMHG | BODY MASS INDEX: 36 KG/M2 | WEIGHT: 216.1 LBS | RESPIRATION RATE: 16 BRPM | HEART RATE: 84 BPM | HEIGHT: 65 IN | TEMPERATURE: 97.8 F | DIASTOLIC BLOOD PRESSURE: 68 MMHG

## 2024-09-26 DIAGNOSIS — M53.3 SACROILIAC JOINT PAIN: Primary | ICD-10-CM

## 2024-09-26 DIAGNOSIS — M53.3 SACROILIAC JOINT PAIN: ICD-10-CM

## 2024-09-26 PROCEDURE — 99214 OFFICE O/P EST MOD 30 MIN: CPT | Performed by: FAMILY MEDICINE

## 2024-09-26 PROCEDURE — 72202 X-RAY EXAM SI JOINTS 3/> VWS: CPT | Mod: TC | Performed by: RADIOLOGY

## 2024-09-26 NOTE — PROGRESS NOTES
"  Assessment & Plan     Sacroiliac joint pain    - XR Sacroiliac Joint G/E 3 Views; Future  Patient will be sent for an image guided SI join injectable treatment. If symptoms resolve we will continue to monitor the patient for any returning of the pain. If the injection fails to alleviate the symptoms we will see the patient again to develop another plan for identifying the source of her pain.    Patient reports ongoing concerns with dizziness.  She has been on topiramate to help with weight loss.  I have asked her to wean this over the next couple weeks and she will make a follow-up appointment in the near future to discuss weight loss, prediabetes, and other health measures.    BMI  Estimated body mass index is 35.96 kg/m  as calculated from the following:    Height as of this encounter: 1.651 m (5' 5\").    Weight as of this encounter: 98 kg (216 lb 1.6 oz).             Tonny Smith is a 49 year old, presenting for the following health issues:  Musculoskeletal Problem (C/o right hip pain) and Medication Follow-up (Discuss current medications)      9/26/2024     3:53 PM   Additional Questions   Roomed by Jenny GUPTA CMA   Accompanied by Self     Musculoskeletal Problem    History of Present Illness       Reason for visit:  Hip pain.  Questions about current meds  Symptom onset:  More than a month  Symptoms include:  Pain in right hip (back side) difficulty walking lifting leg .  Symptom intensity:  Severe  Symptom progression:  Worsening  Had these symptoms before:  No  What makes it worse:  As time goes on, the symptoms have worsened.  Making daily activities difficult.  What makes it better:  Sitting down, getting off my feet, icing my hip, taking IBP, laying flat   She is taking medications regularly.                Objective    /68 (BP Location: Right arm, Patient Position: Sitting, Cuff Size: Adult Large)   Pulse 84   Temp 97.8  F (36.6  C) (Tympanic)   Resp 16   Ht 1.651 m (5' 5\")   Wt 98 kg (216 " lb 1.6 oz)   LMP 09/10/2024 (Exact Date)   SpO2 96%   BMI 35.96 kg/m    Body mass index is 35.96 kg/m .  Physical Exam   GENERAL: alert and no distress  RESP: lungs clear to auscultation - no rales, rhonchi or wheezes  CV: regular rate and rhythm, normal S1 S2, no S3 or S4, no murmur, click or rub, no peripheral edema   MS: normal muscle tone, normal range of motion, gait normal, no ataxia, reflexes were normal on examination but pain was observed on palpation of the lower back/SI joint. CHANELL was normal  PSYCH: mentation appears normal, affect normal/bright            Signed Electronically by: Vlad Farley MD

## 2024-10-03 ENCOUNTER — TELEPHONE (OUTPATIENT)
Dept: FAMILY MEDICINE | Facility: CLINIC | Age: 49
End: 2024-10-03
Payer: COMMERCIAL

## 2024-10-03 NOTE — TELEPHONE ENCOUNTER
Please print and Re-Fax orders:  Patient calling as [facility] is saying they do not have fax for INJECTION.      Order for SI injection faxed to [facility] at [phone] and they will call pt to schedule appt.     [id number] - IR SACROILIAC DIAGNOSTIC INJECTION RIGHT

## 2024-10-04 ENCOUNTER — TELEPHONE (OUTPATIENT)
Dept: FAMILY MEDICINE | Facility: CLINIC | Age: 49
End: 2024-10-04
Payer: COMMERCIAL

## 2024-10-04 NOTE — TELEPHONE ENCOUNTER
Zia calling from specialty clinic at [facility]. She says that they do not do the IR sacroiliac diagnostic injection right. Patient will need to go to [facility].  Order faxed to [facility]. Patient is agreeable to going there for injection, she will wait to get a call from them to schedule and let us know if she has any issues.

## 2024-10-28 ENCOUNTER — TELEPHONE (OUTPATIENT)
Dept: FAMILY MEDICINE | Facility: CLINIC | Age: 49
End: 2024-10-28
Payer: COMMERCIAL

## 2024-10-28 NOTE — TELEPHONE ENCOUNTER
General Call    Contacts       Contact Date/Time Type Contact Phone/Fax    10/28/2024 09:18 AM CDT Phone (Incoming) [facility]           Reason for Call:  Chart Records    What are your questions or concerns:  Women from [facility] says they need chart records and information on Pt.  She could not verify what records were for and TC sees nothing in the chart.  TC advised that I would need the Pt on the line or a EDUARDO signed and she told me they do not speak to the patients.  TC advised that we cannot release records without knowing to who, why, and with no consent     Zia Escobar

## 2025-03-16 ENCOUNTER — HEALTH MAINTENANCE LETTER (OUTPATIENT)
Age: 50
End: 2025-03-16